# Patient Record
Sex: FEMALE | Race: OTHER | Employment: UNEMPLOYED | URBAN - METROPOLITAN AREA
[De-identification: names, ages, dates, MRNs, and addresses within clinical notes are randomized per-mention and may not be internally consistent; named-entity substitution may affect disease eponyms.]

---

## 2019-01-25 ENCOUNTER — APPOINTMENT (OUTPATIENT)
Dept: GENERAL RADIOLOGY | Age: 34
End: 2019-01-25
Attending: EMERGENCY MEDICINE
Payer: SELF-PAY

## 2019-01-25 ENCOUNTER — HOSPITAL ENCOUNTER (EMERGENCY)
Age: 34
Discharge: HOME | End: 2019-01-25
Attending: EMERGENCY MEDICINE
Payer: SELF-PAY

## 2019-01-25 VITALS
HEIGHT: 63 IN | DIASTOLIC BLOOD PRESSURE: 83 MMHG | OXYGEN SATURATION: 100 % | HEART RATE: 73 BPM | TEMPERATURE: 97.9 F | WEIGHT: 188 LBS | RESPIRATION RATE: 16 BRPM | BODY MASS INDEX: 33.31 KG/M2 | SYSTOLIC BLOOD PRESSURE: 118 MMHG

## 2019-01-25 DIAGNOSIS — K21.9 GASTROESOPHAGEAL REFLUX DISEASE, ESOPHAGITIS PRESENCE NOT SPECIFIED: Primary | ICD-10-CM

## 2019-01-25 PROCEDURE — 71046 X-RAY EXAM CHEST 2 VIEWS: CPT

## 2019-01-25 PROCEDURE — 93005 ELECTROCARDIOGRAM TRACING: CPT

## 2019-01-25 PROCEDURE — 99282 EMERGENCY DEPT VISIT SF MDM: CPT

## 2019-01-25 PROCEDURE — 74011250637 HC RX REV CODE- 250/637: Performed by: EMERGENCY MEDICINE

## 2019-01-25 RX ORDER — FAMOTIDINE 40 MG/1
40 TABLET, FILM COATED ORAL
Qty: 15 TAB | Refills: 0 | Status: SHIPPED | OUTPATIENT
Start: 2019-01-25 | End: 2019-02-09

## 2019-01-25 RX ORDER — FAMOTIDINE 20 MG/1
20 TABLET, FILM COATED ORAL
Status: COMPLETED | OUTPATIENT
Start: 2019-01-25 | End: 2019-01-25

## 2019-01-25 RX ADMIN — FAMOTIDINE 20 MG: 20 TABLET ORAL at 13:02

## 2019-01-25 NOTE — ED PROVIDER NOTES
28 y.o. female with no significant past medical history who presents from home with chief complaint of epigastric pain. Pt has been experiencing worsening epigastric pain over the past 2 months with associated nausea. Her sx are worse at night and exacerbated by eating and deep pleuritic movement. Pt states that she has tried drinking water and milk and milk somewhat alleviates her symptoms. Pt denies fever, vomiting, diarrhea. There are no other acute medical concerns at this time. Social hx: no tobacco use; no EtOH use Note written by Lata Mace, as dictated by oJ Vigil MD 12:51 PM 
 
 
The history is provided by the patient and a relative. No past medical history on file. No past surgical history on file. No family history on file. Social History Socioeconomic History  Marital status: SINGLE Spouse name: Not on file  Number of children: Not on file  Years of education: Not on file  Highest education level: Not on file Social Needs  Financial resource strain: Not on file  Food insecurity - worry: Not on file  Food insecurity - inability: Not on file  Transportation needs - medical: Not on file  Transportation needs - non-medical: Not on file Occupational History  Not on file Tobacco Use  Smoking status: Never Smoker Substance and Sexual Activity  Alcohol use: No  
 Drug use: Not on file  Sexual activity: Not on file Other Topics Concern  Not on file Social History Narrative  Not on file ALLERGIES: Patient has no known allergies. Review of Systems Constitutional: Negative for chills and fever. Respiratory: Negative for shortness of breath. Cardiovascular: Negative for chest pain. Gastrointestinal: Positive for abdominal pain and nausea. Negative for constipation, diarrhea and vomiting. Neurological: Negative for dizziness and light-headedness. All other systems reviewed and are negative. Vitals:  
 01/25/19 1251 BP: 118/83 Pulse: 73 Resp: 16 Temp: 97.9 °F (36.6 °C) SpO2: 100% Weight: 85.3 kg (188 lb) Height: 5' 3\" (1.6 m) Physical Exam  
Constitutional: She is oriented to person, place, and time. She appears well-developed and well-nourished. HENT:  
Head: Normocephalic and atraumatic. Eyes: EOM are normal. Pupils are equal, round, and reactive to light. No scleral icterus. Neck: Normal range of motion. Cardiovascular: Normal rate, regular rhythm, normal heart sounds and intact distal pulses. Pulmonary/Chest: Effort normal and breath sounds normal.  
Abdominal: Soft. Bowel sounds are normal. She exhibits no distension. Musculoskeletal: Normal range of motion. Neurological: She is alert and oriented to person, place, and time. Skin: No rash noted. No erythema. Nursing note and vitals reviewed. Note written by Lata Aguirre, as dictated by Rowan Barrios MD 12:51 PM 
 
 
MDM Number of Diagnoses or Management Options Gastroesophageal reflux disease, esophagitis presence not specified: established and worsening Diagnosis management comments: The patient is resting comfortably and feels better, is alert and in no distress. The repeat examination is unremarkable and benign; in particular, there is no discomfort at McBurney's point. The history, exam, diagnostic testing, and current condition do not suggest acute appendicitis, bowel obstruction, incarcerated hernia, acute cholecystitis, bowel perforation, major gastrointestinal bleeding, severe diverticulitis, sepsis, or other significant pathology to warrant further testing, continued ED treatment, admission, or surgical evaluation at this point. The vital signs have been stable and are within normal limits at this time.  The patient does not have uncontrollable pain, intractable vomiting, or other significant symptoms. The patient's condition is stable and appropriate for discharge. The patient will pursue further outpatient evaluation with the primary care physician or other designated or consulting physician as indicated in the discharge instructions. Procedures ED EKG interpretation: 
Rhythm: normal sinus rhythm; and regular . Rate (approx.): 63; ST/T wave: no changes; no ectopy. Note written by Lata Garibay, as dictated by Zane Reyna MD 1:01 PM 
 
2:08 PM 
Patient's results have been reviewed with them. Patient and/or family have verbally conveyed their understanding and agreement of the patient's signs, symptoms, diagnosis, treatment and prognosis and additionally agree to follow up as recommended or return to the Emergency Room should their condition change prior to follow-up. Discharge instructions have also been provided to the patient with some educational information regarding their diagnosis as well a list of reasons why they would want to return to the ER prior to their follow-up appointment should their condition change.

## 2019-01-25 NOTE — ED NOTES
Pt reports improvement in symptoms post medication. Pt given discharge instructions and all questions answered. Denies questions. Pt leaves in no acute distress.

## 2019-01-25 NOTE — DISCHARGE INSTRUCTIONS
Patient Education        Enfermedad de reflujo gastroesofágico (GERD): Instrucciones de cuidado - [ Gastroesophageal Reflux Disease (GERD): Care Instructions ]  Instrucciones de cuidado    La enfermedad de reflujo gastroesofágico (GERD, por mike siglas en inglés) es cuando el ácido estomacal se devuelve hacia el esófago. El esófago es el conducto que va de la garganta al [facility] [place]. Kita válvula de kita roberta vía elizabeth que el ácido del estómago se devuelva por aurelio conducto. Cuando usted tiene GERD, esta válvula no se pardeep lo suficientemente firme. Si usted tiene síntomas leves de GERD, aleksandar acidez estomacal, es posible que pueda controlar el problema con antiácidos o medicamentos de [employer]. Cambiar la alimentación, bajar de peso y hacer otros cambios en el estilo de edward también pueden ayudar a reducir los síntomas. La atención de seguimiento es kita parte clave de diaz tratamiento y seguridad. Asegúrese de hacer y acudir a todas las citas, y llame a diaz médico si está teniendo problemas. También es kita buena idea saber los resultados de mike exámenes y mantener kita lista de los medicamentos que maria elena. ¿Cómo puede cuidarse en el hogar? · [employer] medicamentos exactamente aleksandar le fueron recetados. Llame a diaz médico si jeferson estar teniendo problemas con diaz medicamento. · Diaz médico le podría recomendar medicamentos de [employer]. Para la indigestión leve u ocasional pueden servirle los antiácidos aleksandar Tums, Gaviscon, Mylanta o Maalox. Diaz médico también podría recomendar reductores de ácido de venta ade, aleksandar Pepcid AC, Tagamet HB, Zantac 75 o Prilosec. Quynh y siga todas las instrucciones de la [place]. Si [place] estos medicamentos con frecuencia, hable con diaz médico.  · Cambie mike hábitos alimenticios. ? Es mejor comer varias comidas pequeñas en lugar de dos o luz comidas abundantes. ? Después de comer, espere de 2 a 3 horas antes de recostarse. ? El chocolate, la menta y el alcohol pueden empeorar la GERD. ?  En algunas personas, los alimentos condimentados, los alimentos que tienen mucho ácido (aleksandar los tomates y las naranjas) y el café pueden empeorar los síntomas de la GERD. Si columba síntomas empeoran después de comer un determinado alimento, se recomienda que deje de comer yanet alimento para neo si columba síntomas mejoran. · No fume ni masque tabaco. Fumar puede agravar la GERD. Si necesita ayuda para dejar de usar tabaco, hable con estevez médico [employer] y medicamentos para dejar de usarlo. Éstos pueden aumentar columba probabilidades de dejar el hábito para siempre. · Si tiene síntomas de GERD robel la noche, eleve la cabecera de estevez cama entre 6 y 6 pulgadas (entre 15 y 20 cm) colocando ladrillos debajo del darwin de la cama o kita cuña de espuma debajo de la cabecera del colchón. (Usar almohadas adicionales no funciona). · No use ropa que le ajuste mucho la parte media del cuerpo. · Baje de peso, si necesita hacerlo. Perder sólo de 5 a 10 libras (2.3 a 4.5 kg) puede ayudarle. ¿Cuándo debe pedir ayuda? Llame a estevez médico ahora mismo o busque atención médica inmediata si:    · Tiene un dolor de estómago nuevo o distinto.     · Columba heces son negruzcas y parecidas al alquitrán o tienen rastros de mata.    Preste especial atención a los cambios en estevez keerthi y asegúrese de comunicarse con estevez médico si:    · Columba síntomas no mendez tomy después de 2 días.     · La comida parece quedarle atorada en la garganta o el pecho. ¿Dónde puede encontrar más información en inglés? [facility] a http://pj-nasima.info/. Elinda Farrar T623 en la búsqueda para aprender más acerca de \"Enfermedad de reflujo gastroesofágico (GERD): Instrucciones de cuidado - [ Gastroesophageal Reflux Disease (GERD): Care Instructions ]. \"  Revisado: Epifanio 67, 2018  Versión del contenido: 11.9  © 7561-1924 [employer], [employer].  Las instrucciones de cuidado fueron adaptadas bajo licencia por [employer] (which disclaims liability or warranty for this information). Si usted tiene [place] afección médica o sobre estas instrucciones, siempre pregunte a estevez profesional de keerthi. [facility] niega toda garantía o responsabilidad por estevez uso de esta información.

## 2019-01-28 LAB
ATRIAL RATE: 63 BPM
CALCULATED P AXIS, ECG09: 18 DEGREES
CALCULATED R AXIS, ECG10: 3 DEGREES
CALCULATED T AXIS, ECG11: 9 DEGREES
DIAGNOSIS, 93000: NORMAL
P-R INTERVAL, ECG05: 102 MS
Q-T INTERVAL, ECG07: 424 MS
QRS DURATION, ECG06: 74 MS
QTC CALCULATION (BEZET), ECG08: 433 MS
VENTRICULAR RATE, ECG03: 63 BPM

## 2019-09-04 ENCOUNTER — LAB ONLY (OUTPATIENT)
Dept: FAMILY MEDICINE CLINIC | Age: 34
End: 2019-09-04

## 2019-09-04 DIAGNOSIS — Z13.9 ENCOUNTER FOR SCREENING: Primary | ICD-10-CM

## 2019-09-04 LAB
HCG URINE, QL. (POC): POSITIVE
VALID INTERNAL CONTROL?: YES

## 2019-09-04 NOTE — PROGRESS NOTES
Completed confirmation of pregnancy form and gave to pt. Call made to [facility] to make appt, not able to get on at this time. They advised that the will call pt to make appt. Provided pt with information for [address], advised that if she does not hear from them in 24hrs she will need to to call them. [facility] info given and explained. Told to make sure she lets them know she does not have insurance when she calls to make her appt at [facility]. Explained that the care card is our financial assistance program.  Also advised pt to start prenatal vitamins as soon as possible. Pt indicated understanding and had no questions.     LMP 05/12/19  EDC 02/17/20

## 2019-09-12 ENCOUNTER — INITIAL PRENATAL (OUTPATIENT)
Dept: FAMILY MEDICINE CLINIC | Age: 34
End: 2019-09-12

## 2019-09-12 ENCOUNTER — HOSPITAL ENCOUNTER (OUTPATIENT)
Dept: LAB | Age: 34
Discharge: HOME | End: 2019-09-12
Payer: SUBSIDIZED

## 2019-09-12 VITALS
TEMPERATURE: 97 F | DIASTOLIC BLOOD PRESSURE: 65 MMHG | HEIGHT: 61 IN | RESPIRATION RATE: 14 BRPM | OXYGEN SATURATION: 97 % | BODY MASS INDEX: 39.88 KG/M2 | WEIGHT: 211.2 LBS | HEART RATE: 82 BPM | SYSTOLIC BLOOD PRESSURE: 102 MMHG

## 2019-09-12 DIAGNOSIS — O09.32 INITIAL OBSTETRIC VISIT IN SECOND TRIMESTER: Primary | ICD-10-CM

## 2019-09-12 DIAGNOSIS — O24.419 GESTATIONAL DIABETES MELLITUS (GDM) AFFECTING FOURTH PREGNANCY: ICD-10-CM

## 2019-09-12 LAB
BILIRUB UR QL STRIP: NEGATIVE
GLUCOSE UR-MCNC: NEGATIVE MG/DL
HBSAG, EXTERNAL: NEGATIVE
HIV, EXTERNAL: NORMAL
KETONES P FAST UR STRIP-MCNC: NORMAL MG/DL
PH UR STRIP: 7 [PH] (ref 4.6–8)
PROT UR QL STRIP: NEGATIVE
RUBELLA, EXTERNAL: NORMAL
SP GR UR STRIP: 1.01 (ref 1–1.03)
T. PALLIDUM, EXTERNAL: NEGATIVE
TYPE, ABO & RH, EXTERNAL: NORMAL
UA UROBILINOGEN AMB POC: NORMAL (ref 0.2–1)
URINALYSIS CLARITY POC: CLEAR
URINALYSIS COLOR POC: YELLOW
URINE BLOOD POC: NEGATIVE
URINE LEUKOCYTES POC: NORMAL
URINE NITRITES POC: NEGATIVE

## 2019-09-12 PROCEDURE — 87624 HPV HI-RISK TYP POOLED RSLT: CPT

## 2019-09-12 PROCEDURE — 88175 CYTOPATH C/V AUTO FLUID REDO: CPT

## 2019-09-12 RX ORDER — GUAIFENESIN 100 MG/5ML
81 LIQUID (ML) ORAL DAILY
Qty: 30 TAB | Refills: 3 | Status: SHIPPED | OUTPATIENT
Start: 2019-09-12 | End: 2020-01-23

## 2019-09-12 NOTE — PATIENT INSTRUCTIONS
Semanas 18 a 22 de diaz embarazo: Instrucciones de cuidado - [ Archana Eddy 18 to 22 of Your Pregnancy: Care Instructions ]  Instrucciones de cuidado    Diaz bebé continúa desarrollándose rápidamente. En esta etapa, los bebés ya pueden chuparse el pulgar, agarrar firmemente con las [place], y abrir y cerrar los párpados. En algún Fan's 18 y 25, comenzará a sentir que el bebé se [place]. Al principio, estos pequeños movimientos se sentirán aleksandar un aleteo o un vuelo de mariposas. Algunas mujeres dicen que sienten aleksandar burbujas de [place]. A medida que el bebé crece, estos movimientos serán más craolina. También podría observar que diaz bebé patea y tiene hipo. Robel aurelio María, podría descubrir que las náuseas y la fatiga desaparecieron. En general, es posible que se sienta mejor y tenga más energía que la que tenía robel el primer trimestre. Sin embargo, también podría tener nuevas [place], aleksandar problemas para dormir o calambres en las piernas. Esta hoja de cuidados la ayudará a aliviar esas molestias. La atención de seguimiento es kita parte clave de diaz tratamiento y seguridad. Asegúrese de hacer y acudir a todas las citas, y llame a diaz médico si está teniendo problemas. También es kita buena idea saber los resultados de mike exámenes y mantener kita lista de los medicamentos que maria elena. ¿Cómo puede cuidarse en el hogar? Alivie los problemas para dormir  · Evite la cafeína en las bebidas o los chocolates a última hora del día. · Moisés algo de ejercicio todos los días. · Dúchese o báñese en agua tibia antes de irse a la cama. · Coma un refrigerio liviano o camille un vaso de leche a la hora de dormir. · Moisés ejercicios de relajación en la cama para tranquilizar diaz mente y diaz cuerpo. · Apoye mike piernas y diaz espalda con almohadas adicionales. Si duerme de costado, pruebe a ponerse kita [employer] mike piernas. · No use píldoras para dormir ni consuma alcohol. Podrían hacerle daño a diaz bebé.   Sukhi & Paula calambres en las piernas  · No masajee estevez pantorrilla mientras tiene un calambre. · Siéntese en kita cama o silla firme. Estire la wanger y [facility] [employer] [facility] ([facility] el [facility]) despacio, [place] patty, en dirección a la rodilla. Doble los dedos de los pies hacia arriba y [place]. · Póngase de pie sobre kita superficie plana y fresca. Estire los dedos de los pies hacia arriba y dé pequeños pasos con el talón. · Use kita almohadilla térmica o kita bolsa de [place] para aliviar palma musculares. Prevenga los calambres en las piernas  · Asegúrese de consumir suficiente calcio. Si está preocupada porque no está obteniendo lo suficiente, hable con estevez médico.  · Moisés ejercicio todos los nabila y estire las piernas antes de irse a dormir. · Dese un baño tibio antes de irse a dormir y pruebe a usar calentadores de piernas. ¿Dónde puede encontrar más información en inglés? [place] a http://pj-nasima.info/. Santana Alarcon A073 en la búsqueda para aprender más acerca de \"Semanas 18 a 22 de estevez embarazo: Instrucciones de cuidado - [ Rhunedane Simmonsick 18 to 22 of Your Pregnancy: Care Instructions ]. \"  Revisado: 5 septiembre, 2018  Versión del contenido: 12.1  © 5599-7614 [employer]wise, Incorporated. Las instrucciones de cuidado fueron adaptadas bajo licencia por [employer] (which disclaims liability or warranty for this information). Si usted tiene [place] afección médica o sobre estas instrucciones, siempre pregunte a estevez profesional de keerthi. [employer]wise, Incorporated niega toda garantía o responsabilidad por estevez uso de esta información.

## 2019-09-12 NOTE — PROGRESS NOTES
Subjective:   William Wheat is a 27 y.o.  at 18w3d who is being seen today for her first obstetrical visit. This is a not planned pregnancy but it is welcomed. Patient was not using any birth control, only condoms with her . She is at 18w3d gestation by LMP approximately  2019     [id number]  · 5th pregnancy (current)   · 4th pregnancy ()- Male. Delivery method: Vaginal. Fetal weight: 8lbs. Complications: Gestational diabetes, resolved. In [address]   · 3rd pregnancy (): miscarriage at 3 months  · 2nd pregnancy ()- Male. Delivery method: Vaginal. Fetal weight: 7lbs . Complications: None. In [address]   · 1st pregnancy ()- Female. Delivery method: Home delivery, . Fetal weight: unsure. Complications: None. In [place]. History of GDM or DM? Yes in 4th pregnancy  History of GHTN or HTN? No  History of pre-eclampsia? No  Taking prenatal vitamins? Not at this time. Desires genetic testing? Not at this time    Allergies  No Known Allergies    Medications  Current Outpatient Medications   Medication Sig    prenatal vit-calcium-iron-fa (PRENATAL PLUS WITH CALCIUM) 27 mg iron- 1 mg tab Take 1 Tab by mouth daily. No current facility-administered medications for this visit. Past Medical History  History reviewed. No pertinent past medical history. STDs? None    Past Surgical History  History reviewed. No pertinent surgical history. CSections? None    Family History  Genetic abnormalities?  None    Social History  Social History     Tobacco Use    Smoking status: Never Smoker    Smokeless tobacco: Never Used   Substance Use Topics    Alcohol use: Not Currently    Drug use: Never     Objective:     Visit Vitals  /65 (BP 1 Location: Left arm, BP Patient Position: Sitting)   Pulse 82   Temp 97 °F (36.1 °C) (Oral)   Resp 14   Ht 5' 1\" (1.549 m)   Wt 211 lb 3.2 oz (95.8 kg)   LMP 2019   SpO2 97%   BMI 39.91 kg/m²     See physical exam on flowsheet  [employer] exam chaperoned by Flo Chamberlain LPN    Labs  Initial OB labs collected at today's visit   Recent Results (from the past 12 hour(s))   AMB POC URINALYSIS DIP STICK AUTO W/O MICRO    Collection Time: 19 11:53 AM   Result Value Ref Range    Color (UA POC) Yellow     Clarity (UA POC) Clear     Glucose (UA POC) Negative Negative    Bilirubin (UA POC) Negative Negative    Ketones (UA POC) Trace Negative    Specific gravity (UA POC) 1.015 1.001 - 1.035    Blood (UA POC) Negative Negative    pH (UA POC) 7.0 4.6 - 8.0    Protein (UA POC) Negative Negative    Urobilinogen (UA POC) 0.2 mg/dL 0.2 - 1    Nitrites (UA POC) Negative Negative    Leukocyte esterase (UA POC) 3+ Negative     Assessment and Plan:   Armin Schulz is a 27 y.o.  at 18w3d by LMP here for initial OB visit. Estimated Date of Delivery: 2/10/20     · IOB labs collected (CBC without diff, blood type & screen, Rh antibody screen, rubella titer, HBsAg titer, RPR, HIV, UA w/ cx, pap smear, gonorrhea/chlamydia)  · Discussed recommended weight gain (current BMI  >29) Plan for a 11-20lbs weight gain    · Recommended prenatal vitamins, script sent to pharmacy at patient request  · Patient with dysuria and +3 LE  - will follow up for urine culture  · AMA: start Aspirin  · Hx of GDM with obesity: will obtain early 1hr OGTT   · Discussed optional genetic screening: Patient will discuss with partner   · Request for M anatomy scan faxed: Yes  · Dating ultrasound scheduled: Deferred for anatomy ultrasound   · Follow up scheduled with myself in 4 weeks     I have discussed the diagnosis with the patient and the intended plan as seen in the above orders. The patient has received an after-visit summary and questions were answered concerning future plans. I have discussed medication side effects and warnings with the patient as well.  Informed pt to return to the office or go to the ER if she experiences vaginal bleeding, vaginal discharge, leaking of fluid, pelvic cramping.       Patient discussed with Dr. Renée Melgar (Attending Physician)    Amilcar Mi MD  Family Medicine Resident

## 2019-09-12 NOTE — PROGRESS NOTES
28 yo  here at 25 w 3d    G1 -- home delivery  G2 --    G3 -- SAb  G4 -- GDM A1     Offered genetic testing    AMA  Maternal obesity  Hx GDM A1    U/A:  3+    Plan:  Fetal anatomy scan  Start baby ASA   Urine culture     I reviewed with the resident the medical history and the resident's findings on the physical examination. I discussed with the resident the patient's diagnosis and concur with the plan.

## 2019-09-14 LAB — BACTERIA UR CULT: ABNORMAL

## 2019-09-16 ENCOUNTER — TELEPHONE (OUTPATIENT)
Dept: FAMILY MEDICINE CLINIC | Age: 34
End: 2019-09-16

## 2019-09-16 RX ORDER — PYRIDOXINE HCL (VITAMIN B6) 25 MG
25 TABLET ORAL DAILY
Qty: 30 TAB | Refills: 3 | Status: ON HOLD | OUTPATIENT
Start: 2019-09-16 | End: 2020-01-20

## 2019-09-16 RX ORDER — NITROFURANTOIN 25; 75 MG/1; MG/1
100 CAPSULE ORAL 2 TIMES DAILY
Qty: 10 CAP | Refills: 0 | Status: SHIPPED | OUTPATIENT
Start: 2019-09-16 | End: 2019-10-11

## 2019-09-16 NOTE — TELEPHONE ENCOUNTER
----- Message from Ivania Munroe sent at 9/14/2019  9:30 AM EDT -----  Can you assist with scheduling an appt for patient. She needs a lab only appt. for 1 hour glucose. Bhanu Gerber    ----- Message -----  From: Cheryl Raines MD  Sent: 9/12/2019   4:10 PM EDT  To: Jade Andrew,   Can we please schedule this patient to come in within a week or so to get an early 1hr glucose tolerance test done? Thank you!

## 2019-09-16 NOTE — TELEPHONE ENCOUNTER
Called with [facility] and spoke with the patient and scheduled for 9/18 lab appointment. She is now calling to say she has an infection and the doctor did not order the medication for her. Also she said she asked the doctor to measure her at the visit and she did not do it.

## 2019-09-16 NOTE — PROGRESS NOTES
Patient was symptomatic at visit and continues to be symptomatic. Will send in [facility] prescription.  I will call the patient

## 2019-09-16 NOTE — TELEPHONE ENCOUNTER
11:43 AM   Interpretor #[id number]    I returned Nunu's call. I sent in Macrobid 100mg BID for 5 days to the pharmacy. I also asked the patient to  her daily Aspirin. She is also to continue to take her prenatal vitamins and Unisom/B6 for sleep and nausea. I explained that we do not normally measure her abdomen until 20 weeks and we will be in touch soon when her MFM appointment is scheduled. She was in agreement of this plan.

## 2019-09-17 LAB
ABO GROUP BLD: NORMAL
BASOPHILS # BLD AUTO: 0.1 X10E3/UL (ref 0–0.2)
BASOPHILS NFR BLD AUTO: 0 %
EOSINOPHIL # BLD AUTO: 0.8 X10E3/UL (ref 0–0.4)
EOSINOPHIL NFR BLD AUTO: 6 %
ERYTHROCYTE [DISTWIDTH] IN BLOOD BY AUTOMATED COUNT: 20 % (ref 12.3–15.4)
HBV SURFACE AG SERPL QL IA: NEGATIVE
HCT VFR BLD AUTO: 27.3 % (ref 34–46.6)
HGB A MFR BLD: 98.2 % (ref 96.4–98.8)
HGB A2 MFR BLD COLUMN CHROM: 1.8 % (ref 1.8–3.2)
HGB BLD-MCNC: 7.8 G/DL (ref 11.1–15.9)
HGB C MFR BLD: 0 %
HGB F MFR BLD: 0 % (ref 0–2)
HGB FRACT BLD-IMP: NORMAL
HGB OTHER MFR BLD HPLC: 0 %
HGB S BLD QL SOLY: NEGATIVE
HGB S MFR BLD: 0 %
HIV 1+2 AB+HIV1 P24 AG SERPL QL IA: NON REACTIVE
IMM GRANULOCYTES # BLD AUTO: 0.1 X10E3/UL (ref 0–0.1)
IMM GRANULOCYTES NFR BLD AUTO: 1 %
LYMPHOCYTES # BLD AUTO: 2.1 X10E3/UL (ref 0.7–3.1)
LYMPHOCYTES NFR BLD AUTO: 15 %
MCH RBC QN AUTO: 17.2 PG (ref 26.6–33)
MCHC RBC AUTO-ENTMCNC: 28.6 G/DL (ref 31.5–35.7)
MCV RBC AUTO: 60 FL (ref 79–97)
MONOCYTES # BLD AUTO: 0.7 X10E3/UL (ref 0.1–0.9)
MONOCYTES NFR BLD AUTO: 5 %
NEUTROPHILS # BLD AUTO: 10 X10E3/UL (ref 1.4–7)
NEUTROPHILS NFR BLD AUTO: 73 %
PLATELET # BLD AUTO: 203 X10E3/UL (ref 150–450)
RBC # BLD AUTO: 4.54 X10E6/UL (ref 3.77–5.28)
RH BLD: POSITIVE
RUBV IGG SERPL IA-ACNC: 8.03 INDEX
T PALLIDUM AB SER QL IA: NEGATIVE
VZV IGG SER IA-ACNC: 871 INDEX
WBC # BLD AUTO: 13.8 X10E3/UL (ref 3.4–10.8)

## 2019-09-18 ENCOUNTER — LAB ONLY (OUTPATIENT)
Dept: FAMILY MEDICINE CLINIC | Age: 34
End: 2019-09-18

## 2019-09-18 DIAGNOSIS — O24.419 GESTATIONAL DIABETES MELLITUS (GDM) AFFECTING FOURTH PREGNANCY: ICD-10-CM

## 2019-09-19 ENCOUNTER — TELEPHONE (OUTPATIENT)
Dept: FAMILY MEDICINE CLINIC | Age: 34
End: 2019-09-19

## 2019-09-19 DIAGNOSIS — R73.09 GLUCOSE TOLERANCE TEST ABNORMAL: ICD-10-CM

## 2019-09-19 DIAGNOSIS — D50.9 IRON DEFICIENCY ANEMIA DURING PREGNANCY: Primary | ICD-10-CM

## 2019-09-19 DIAGNOSIS — O99.019 IRON DEFICIENCY ANEMIA DURING PREGNANCY: Primary | ICD-10-CM

## 2019-09-19 LAB — GLUCOSE 1H P 50 G GLC PO SERPL-MCNC: 194 MG/DL (ref 65–139)

## 2019-09-20 NOTE — TELEPHONE ENCOUNTER
Interpetor # [id number]    I called and spoke to [facility] regarding her lab results and the plan going forward. 1. Abnormal 1hr GTT: She will return for a 3hr GTT pending a call from [facility] with a lab appointment     2. Anemia: I have ordered more blood tests to work up her anemia: to be drawn when she comes in for her lab visit. She is aware to  her iron supplements from the pharmacy. She will likely need IV iron infusions through Heme/Onc, she is aware of this. ->I called Heme/Onc and made her an appointment with Dr. Juan R Calvillo on Friday Oct 4th at 8.30am (please arrive at [address] [place] [address], [address] [place] [address].)   [facility] to let her know about this appointment when calling to schedule her lab visit. 3. Yeast Vaginits: I sent in a vaginal Clotrimazole prescription for 3 days to treat the yeast.      4. MFM: appointment scheduled on 09/26 at 8am for anatomy ultrasound. Patient made aware of this appointment during our phone call.

## 2019-09-20 NOTE — PROGRESS NOTES
PNL: O positive. CBC with Hb 7.8. Nml adult Hb present. HepB neg. VZV/Rubella immune. Tpall negative. HIV NR. G/C pending. Pap NILM, negative HPV. Yeast present. Failed early 1 hr GTT - will order 3hr GTT    CBC: severely anemic, Hb 7.8. Will order ferrous sulfate TID. Further anemia labs ordered. Will refer patient to Heme/Onc for infusions and evaluation. Will also speak to MFM. I will call the patient to let her know.

## 2019-09-23 ENCOUNTER — TELEPHONE (OUTPATIENT)
Dept: FAMILY MEDICINE CLINIC | Age: 34
End: 2019-09-23

## 2019-09-23 RX ORDER — LANOLIN ALCOHOL/MO/W.PET/CERES
325 CREAM (GRAM) TOPICAL
Qty: 90 TAB | Refills: 2 | Status: SHIPPED | OUTPATIENT
Start: 2019-09-23 | End: 2019-10-11

## 2019-09-23 NOTE — TELEPHONE ENCOUNTER
Called pt using [employer] phone. 3hr GTT is Wed. 9/25/19 at 8am. She knows to be fasting. Reviewed Heme/Onc appt with pt. Also reviewed MFM appt. Pt voiced understanding of all 3 appts.

## 2019-09-23 NOTE — TELEPHONE ENCOUNTER
The patient will need to come in for a 3hr GTT and the bloodwork I've ordered. She is aware that [facility] will call her to set this appt up. I also made her an appointment with Heme/Onc's Dr. Carley Ryder on Friday Oct 4th at 8.30am (please arrive at [address] [place] [address], [address] [place] [address]). ->I told her we would let her know about this appointment when we called back to schedule her lab visit. She is Thai speaking. I answered all her questions in our last phone call but please let me know if anything further comes up and I will take care of it.      Thank you

## 2019-09-25 ENCOUNTER — LAB ONLY (OUTPATIENT)
Dept: FAMILY MEDICINE CLINIC | Age: 34
End: 2019-09-25

## 2019-09-25 DIAGNOSIS — D50.9 IRON DEFICIENCY ANEMIA DURING PREGNANCY: ICD-10-CM

## 2019-09-25 DIAGNOSIS — R73.09 GLUCOSE TOLERANCE TEST ABNORMAL: ICD-10-CM

## 2019-09-25 DIAGNOSIS — O99.019 IRON DEFICIENCY ANEMIA DURING PREGNANCY: ICD-10-CM

## 2019-09-26 ENCOUNTER — HOSPITAL ENCOUNTER (OUTPATIENT)
Dept: PERINATAL CARE | Age: 34
Discharge: HOME | End: 2019-09-26
Attending: OBSTETRICS & GYNECOLOGY
Payer: SELF-PAY

## 2019-09-26 PROCEDURE — 76811 OB US DETAILED SNGL FETUS: CPT | Performed by: OBSTETRICS & GYNECOLOGY

## 2019-09-27 LAB
FERRITIN SERPL-MCNC: 9 NG/ML (ref 15–150)
GLUCOSE 1H P 100 G GLC PO SERPL-MCNC: 185 MG/DL (ref 65–179)
GLUCOSE 2H P 100 G GLC PO SERPL-MCNC: 153 MG/DL (ref 65–154)
GLUCOSE 3H P 100 G GLC PO SERPL-MCNC: 107 MG/DL (ref 65–139)
GLUCOSE P FAST SERPL-MCNC: 80 MG/DL (ref 65–94)
HAPTOGLOB SERPL-MCNC: 137 MG/DL (ref 34–200)
IRON SATN MFR SERPL: 9 % (ref 15–55)
IRON SERPL-MCNC: 46 UG/DL (ref 27–159)
LDH SERPL-CCNC: 154 IU/L (ref 119–226)
NOTE:, 102047: ABNORMAL
RETICS/RBC NFR AUTO: 2 % (ref 0.6–2.6)
TIBC SERPL-MCNC: 497 UG/DL (ref 250–450)
UIBC SERPL-MCNC: 451 UG/DL (ref 131–425)

## 2019-09-30 NOTE — TELEPHONE ENCOUNTER
Severe iron deficiency anemia.    Patient started on oral ferrous sulfate TID and has appointment to see Heme/onc this week for eval and likely iron infusions

## 2019-10-02 NOTE — PROGRESS NOTES
[id number] [facility] Oncology at [facility]  [phone]    Hematology / Oncology Consult    Reason for Visit:   Sherwin Moe is a 27 y.o. female who is seen in consultation at the request of Dr. Radha Cardona for evaluation of anemia. History of Present Illness:   Sherwin Moe is a 27 y.o. female who comes in for evaluation of anemia. She is Lithuanian speaking and was interviewed with assistance of a video . Patient is pregnant. She denies melena/hematochezia/hematuria. She denies heavy periods prior to pregnancy. She has had 3 prior pregnancies and did not have severe anemia at that time. No CP, but does endorse SOB with exertion. She endorses ice cravings, which started approx earlier this year. She states she is taking prenatal vitamins, but not taking iron supplements. She is also taking ASA and Vit C. No past medical history on file. No past surgical history on file. Social History     Tobacco Use    Smoking status: Never Smoker    Smokeless tobacco: Never Used   Substance Use Topics    Alcohol use: Not Currently      No family history on file. Current Outpatient Medications   Medication Sig    ferrous sulfate 325 mg (65 mg iron) tablet Take 1 Tab by mouth three (3) times daily (with meals).  nitrofurantoin, macrocrystal-monohydrate, (MACROBID) 100 mg capsule Take 1 Cap by mouth two (2) times a day. Indications: urinary tract infection due to E. coli bacteria    doxylamine succinate (UNISOM) 25 mg tablet Take 1 Tab by mouth nightly as needed for Sleep.  pyridoxine, vitamin B6, (VITAMIN B-6) 25 mg tablet Take 1 Tab by mouth daily.  prenatal vit-calcium-iron-fa (PRENATAL PLUS WITH CALCIUM) 27 mg iron- 1 mg tab Take 1 Tab by mouth daily.  aspirin 81 mg chewable tablet Take 1 Tab by mouth daily.  chlorhexidine (HIBICLENS) 4 % liquid [place] en todo el cuerpo y [place] para david minutos y enjuague.  No poner en mike ojos or boca     No current facility-administered medications for this visit. No Known Allergies     Review of Systems: A complete review of systems was obtained, negative except as described above. Physical Exam:     Visit Vitals  /69   Pulse 78   Temp 98 °F (36.7 °C) (Oral)   Resp 16   Ht 5' 1\" (1.549 m)   Wt 212 lb (96.2 kg)   LMP 05/06/2019   SpO2 98%   BMI 40.06 kg/m²     ECOG PS: 0  General: Well developed, no acute distress  Eyes: PERRLA, EOMI, anicteric sclerae  HENT: Atraumatic, OP clear, TMs intact without erythema  Neck: Supple  Lymphatic: No cervical, supraclavicular, axillary or inguinal adenopathy  Respiratory: CTAB, normal respiratory effort  CV: Normal rate, regular rhythm, no murmurs, no peripheral edema  GI: Soft, nontender, nondistended, no masses, no hepatomegaly, no splenomegaly  MS: Normal gait and station. Digits without clubbing or cyanosis. Skin: No rashes, ecchymoses, or petechiae. Normal temperature, turgor, and texture. Neuro/Psych: Alert, oriented. 5/5 strength in all 4 extremities. Appropriate affect, normal judgment/insight. Results:     Lab Results   Component Value Date/Time    WBC 13.8 (H) 09/12/2019 11:22 AM    HGB 7.8 (L) 09/12/2019 11:22 AM    HCT 27.3 (L) 09/12/2019 11:22 AM    PLATELET 012 39/83/0810 11:22 AM    MCV 60 (L) 09/12/2019 11:22 AM    ABS.  NEUTROPHILS 10.0 (H) 09/12/2019 11:22 AM     Lab Results   Component Value Date/Time    Sodium 138 07/14/2015 10:19 PM    Potassium 4.4 07/14/2015 10:19 PM    Chloride 102 07/14/2015 10:19 PM    CO2 26 07/14/2015 10:19 PM    Glucose 123 (H) 07/14/2015 10:19 PM    Glucose - Fasting 80 09/25/2019 10:37 AM    BUN 7 07/14/2015 10:19 PM    Creatinine 0.62 07/14/2015 10:19 PM    GFR est AA >60 07/14/2015 10:19 PM    GFR est non-AA >60 07/14/2015 10:19 PM    Calcium 8.6 07/14/2015 10:19 PM     No results found for: TBILI, ALT, SGOT, AP, TP, ALB, GLOB  Lab Results   Component Value Date/Time    Iron 46 09/25/2019 10:37 AM    TIBC 497 (H) 09/25/2019 10:37 AM    Iron % saturation 9 (LL) 09/25/2019 10:37 AM    Ferritin 9 (L) 09/25/2019 10:37 AM       No results found for: B12LT, FOL, RBCF  No results found for: TSH, TSH2, TSH3, TSHP, TSHEXT  Lab Results   Component Value Date/Time    Hep B surface Ag screen Negative 09/12/2019 11:22 AM         Imaging:     Radiology report(s) reviewed. Assessment & Plan:   Mayelin Schmid is a 27 y.o. female who is pregnancy comes in for management of iron deficiency anemia. 1. Iron deficiency anemia: Severe, related to increased demand from pregnancy as well as likely prior menstrual loss. She would benefit from parenteral iron, which I have discussed with patient. She needs to also start on oral iron supplements along with stool softeners to avoid constipation. -- Start oral iron supplements if not already started. -- Injectafer x 2  -- Return in 8 weeks for repeat labs    2. Pregnancy: Approx 20 weeks gestation. Followed by Shukridominique Nowak. I appreciate the opportunity to participate in Ms. Kenna Raymond's care.     Signed By: Thao Moore MD     October 2, 2019

## 2019-10-04 ENCOUNTER — OFFICE VISIT (OUTPATIENT)
Dept: ONCOLOGY | Age: 34
End: 2019-10-04

## 2019-10-04 VITALS
TEMPERATURE: 98 F | OXYGEN SATURATION: 98 % | BODY MASS INDEX: 40.02 KG/M2 | WEIGHT: 212 LBS | HEART RATE: 78 BPM | RESPIRATION RATE: 16 BRPM | DIASTOLIC BLOOD PRESSURE: 69 MMHG | HEIGHT: 61 IN | SYSTOLIC BLOOD PRESSURE: 108 MMHG

## 2019-10-04 DIAGNOSIS — Z3A.20 20 WEEKS GESTATION OF PREGNANCY: ICD-10-CM

## 2019-10-04 DIAGNOSIS — D50.8 IRON DEFICIENCY ANEMIA DUE TO DIETARY CAUSES: Primary | ICD-10-CM

## 2019-10-04 PROBLEM — D50.0 IRON DEFICIENCY ANEMIA DUE TO CHRONIC BLOOD LOSS: Status: ACTIVE | Noted: 2019-10-04

## 2019-10-04 RX ORDER — HYDROCORTISONE SODIUM SUCCINATE 100 MG/2ML
100 INJECTION, POWDER, FOR SOLUTION INTRAMUSCULAR; INTRAVENOUS AS NEEDED
Status: CANCELLED | OUTPATIENT
Start: 2019-10-18

## 2019-10-04 RX ORDER — ONDANSETRON 2 MG/ML
8 INJECTION INTRAMUSCULAR; INTRAVENOUS AS NEEDED
Status: CANCELLED | OUTPATIENT
Start: 2019-10-18

## 2019-10-04 RX ORDER — ACETAMINOPHEN 325 MG/1
650 TABLET ORAL AS NEEDED
Status: CANCELLED
Start: 2019-10-18

## 2019-10-04 RX ORDER — HYDROCORTISONE SODIUM SUCCINATE 100 MG/2ML
100 INJECTION, POWDER, FOR SOLUTION INTRAMUSCULAR; INTRAVENOUS AS NEEDED
Status: CANCELLED | OUTPATIENT
Start: 2019-10-25

## 2019-10-04 RX ORDER — HEPARIN 100 UNIT/ML
300-500 SYRINGE INTRAVENOUS AS NEEDED
Status: CANCELLED
Start: 2019-10-18

## 2019-10-04 RX ORDER — DIPHENHYDRAMINE HYDROCHLORIDE 50 MG/ML
50 INJECTION, SOLUTION INTRAMUSCULAR; INTRAVENOUS AS NEEDED
Status: CANCELLED
Start: 2019-10-25

## 2019-10-04 RX ORDER — SODIUM CHLORIDE 0.9 % (FLUSH) 0.9 %
10 SYRINGE (ML) INJECTION AS NEEDED
Status: CANCELLED
Start: 2019-10-25

## 2019-10-04 RX ORDER — ASCORBIC ACID 500 MG
TABLET ORAL DAILY
Status: ON HOLD | COMMUNITY
End: 2020-01-20

## 2019-10-04 RX ORDER — LANOLIN ALCOHOL/MO/W.PET/CERES
325 CREAM (GRAM) TOPICAL
Qty: 60 TAB | Refills: 3 | Status: SHIPPED | OUTPATIENT
Start: 2019-10-04 | End: 2020-01-23

## 2019-10-04 RX ORDER — DIPHENHYDRAMINE HYDROCHLORIDE 50 MG/ML
50 INJECTION, SOLUTION INTRAMUSCULAR; INTRAVENOUS AS NEEDED
Status: CANCELLED
Start: 2019-10-18

## 2019-10-04 RX ORDER — HEPARIN 100 UNIT/ML
300-500 SYRINGE INTRAVENOUS AS NEEDED
Status: CANCELLED
Start: 2019-10-25

## 2019-10-04 RX ORDER — ONDANSETRON 2 MG/ML
8 INJECTION INTRAMUSCULAR; INTRAVENOUS AS NEEDED
Status: CANCELLED | OUTPATIENT
Start: 2019-10-25

## 2019-10-04 RX ORDER — AMOXICILLIN 250 MG
1 CAPSULE ORAL DAILY
Qty: 60 TAB | Refills: 3 | Status: ON HOLD | OUTPATIENT
Start: 2019-10-04 | End: 2020-01-20

## 2019-10-04 RX ORDER — ALBUTEROL SULFATE 0.83 MG/ML
2.5 SOLUTION RESPIRATORY (INHALATION) AS NEEDED
Status: CANCELLED
Start: 2019-10-18

## 2019-10-04 RX ORDER — SODIUM CHLORIDE 9 MG/ML
25 INJECTION, SOLUTION INTRAVENOUS CONTINUOUS
Status: CANCELLED
Start: 2019-10-18

## 2019-10-04 RX ORDER — ACETAMINOPHEN 325 MG/1
650 TABLET ORAL AS NEEDED
Status: CANCELLED
Start: 2019-10-25

## 2019-10-04 RX ORDER — ALBUTEROL SULFATE 0.83 MG/ML
2.5 SOLUTION RESPIRATORY (INHALATION) AS NEEDED
Status: CANCELLED
Start: 2019-10-25

## 2019-10-04 RX ORDER — SODIUM CHLORIDE 9 MG/ML
10 INJECTION INTRAMUSCULAR; INTRAVENOUS; SUBCUTANEOUS AS NEEDED
Status: CANCELLED | OUTPATIENT
Start: 2019-10-18

## 2019-10-04 RX ORDER — SODIUM CHLORIDE 0.9 % (FLUSH) 0.9 %
10 SYRINGE (ML) INJECTION AS NEEDED
Status: CANCELLED
Start: 2019-10-18

## 2019-10-04 RX ORDER — SODIUM CHLORIDE 9 MG/ML
25 INJECTION, SOLUTION INTRAVENOUS CONTINUOUS
Status: CANCELLED
Start: 2019-10-25

## 2019-10-04 RX ORDER — EPINEPHRINE 1 MG/ML
0.3 INJECTION, SOLUTION, CONCENTRATE INTRAVENOUS AS NEEDED
Status: CANCELLED | OUTPATIENT
Start: 2019-10-18

## 2019-10-04 RX ORDER — EPINEPHRINE 1 MG/ML
0.3 INJECTION, SOLUTION, CONCENTRATE INTRAVENOUS AS NEEDED
Status: CANCELLED | OUTPATIENT
Start: 2019-10-25

## 2019-10-04 RX ORDER — SODIUM CHLORIDE 9 MG/ML
10 INJECTION INTRAMUSCULAR; INTRAVENOUS; SUBCUTANEOUS AS NEEDED
Status: CANCELLED | OUTPATIENT
Start: 2019-10-25

## 2019-10-04 NOTE — PROGRESS NOTES
Darline Morel is a 27 y.o. female here today for evaluation of anemia. States she has not picked up ferrous sulfate from pharmacy yet.

## 2019-10-11 ENCOUNTER — ROUTINE PRENATAL (OUTPATIENT)
Dept: FAMILY MEDICINE CLINIC | Age: 34
End: 2019-10-11

## 2019-10-11 VITALS
HEART RATE: 97 BPM | SYSTOLIC BLOOD PRESSURE: 97 MMHG | DIASTOLIC BLOOD PRESSURE: 61 MMHG | WEIGHT: 215 LBS | OXYGEN SATURATION: 98 % | RESPIRATION RATE: 16 BRPM | TEMPERATURE: 98.2 F | HEIGHT: 61 IN | BODY MASS INDEX: 40.59 KG/M2

## 2019-10-11 DIAGNOSIS — O09.32 INITIAL OBSTETRIC VISIT IN SECOND TRIMESTER: ICD-10-CM

## 2019-10-11 DIAGNOSIS — R30.0 DYSURIA DURING PREGNANCY IN SECOND TRIMESTER: ICD-10-CM

## 2019-10-11 DIAGNOSIS — O26.892 DYSURIA DURING PREGNANCY IN SECOND TRIMESTER: ICD-10-CM

## 2019-10-11 DIAGNOSIS — Z3A.22 22 WEEKS GESTATION OF PREGNANCY: Primary | ICD-10-CM

## 2019-10-11 LAB
BILIRUB UR QL STRIP: NEGATIVE
CHLAMYDIA, EXTERNAL: NEGATIVE
GLUCOSE UR-MCNC: NORMAL MG/DL
KETONES P FAST UR STRIP-MCNC: NORMAL MG/DL
N. GONORRHEA, EXTERNAL: NEGATIVE
PH UR STRIP: 6 [PH] (ref 4.6–8)
PROT UR QL STRIP: NEGATIVE
SP GR UR STRIP: 1.02 (ref 1–1.03)
UA UROBILINOGEN AMB POC: NORMAL (ref 0.2–1)
URINALYSIS CLARITY POC: NORMAL
URINALYSIS COLOR POC: YELLOW
URINE BLOOD POC: NEGATIVE
URINE LEUKOCYTES POC: NEGATIVE
URINE NITRITES POC: POSITIVE

## 2019-10-11 RX ORDER — CEPHALEXIN 500 MG/1
500 CAPSULE ORAL 2 TIMES DAILY
Qty: 10 CAP | Refills: 0 | Status: SHIPPED | OUTPATIENT
Start: 2019-10-11 | End: 2019-10-16

## 2019-10-11 RX ORDER — BETA-CAROTENE 10000 UNIT
CAPSULE ORAL
Refills: 0 | COMMUNITY
Start: 2019-10-07 | End: 2019-10-11

## 2019-10-11 NOTE — PROGRESS NOTES
Identified pt with two pt identifiers(name and ). Reviewed record in preparation for visit and have obtained necessary documentation. Chief Complaint   Patient presents with    Routine Prenatal Visit      22w4d Little LOF or Bleeding +FM      Patient states she have thick discharge white in color with odor. Patient states the vaginal cream that was given to her caused her to have a rash in her vagina area. Rash still there a little. Health Maintenance Due   Topic    DTaP/Tdap/Td series (1 - Tdap)    Influenza Age 5 to Adult        Visit Vitals  BP 97/61 (BP 1 Location: Left arm, BP Patient Position: Sitting)   Pulse 97   Temp 98.2 °F (36.8 °C) (Oral)   Resp 16   Ht 5' 1\" (1.549 m)   Wt 215 lb (97.5 kg)   SpO2 98%   BMI 40.62 kg/m²         Coordination of Care Questionnaire:  :   1) Have you been to an emergency room, urgent care, or hospitalized since your last visit? If yes, where when, and reason for visit? no       2. Have seen or consulted any other health care provider since your last visit? If yes, where when, and reason for visit? NO      3) Do you have an Advanced Directive/ Living Will in place? NO  If no, would you like information NO    Patient is accompanied by self I have received verbal consent from Cathy Lee to discuss any/all medical information while they are present in the room.

## 2019-10-11 NOTE — PROGRESS NOTES
Return OB Visit     Subjective:   Joseph David is a 27 y.o.  at 22w4d by LMP c/w 2nd trimester ultrasound. Estimated Date of Delivery: 2/10/20. [email or web address] interpretor#[id number]    LOF: No   Vaginal bleeding: No  Fetal movement (after 20 weeks): Yes   Contractions: No  Dysuria: Yes (see below)   Headaches, blurred vision, RUQ pain: No   Taking prenatal vitamins: Yes   Taking ASA: Yes  Taking ferrous sulfate: Yes, BID. Missed first IV infusion yesterday because she forgot and has so many appointments to keep track of. Constipation: No     Concerns today:  · Dysuria: has been going on for awhile, since the first time she was seen in clinic. She also has some vaginal itching. The Macrobid helped but the vaginal cream made her break out in a rash. She denies any odor or thick vaginal discharge. Allergies   No Known Allergies     Medications:   Current Outpatient Medications   Medication Sig    ascorbic acid, vitamin C, (VITAMIN C) 500 mg tablet Take  by mouth daily.  ferrous sulfate 325 mg (65 mg iron) tablet Take 1 Tab by mouth two (2) times daily (after meals).  senna-docusate (PERICOLACE) 8.6-50 mg per tablet Take 1 Tab by mouth daily.  doxylamine succinate (UNISOM) 25 mg tablet Take 1 Tab by mouth nightly as needed for Sleep.  pyridoxine, vitamin B6, (VITAMIN B-6) 25 mg tablet Take 1 Tab by mouth daily.  prenatal vit-calcium-iron-fa (PRENATAL PLUS WITH CALCIUM) 27 mg iron- 1 mg tab Take 1 Tab by mouth daily.  aspirin 81 mg chewable tablet Take 1 Tab by mouth daily.  3-DAY VAGINAL 2 % vaginal cream INSERT 1 APPLICATORFUL VAGINALLY NIGHTLY FOR 3 DAYS    ferrous sulfate 325 mg (65 mg iron) tablet Take 1 Tab by mouth three (3) times daily (with meals).  nitrofurantoin, macrocrystal-monohydrate, (MACROBID) 100 mg capsule Take 1 Cap by mouth two (2) times a day.  Indications: urinary tract infection due to E. coli bacteria    chlorhexidine (HIBICLENS) 4 % liquid Natalya hirsch el cuerpo y [place] para david minutos y enjuague. No poner en mike ojos or boca     No current facility-administered medications for this visit. Past Medical History:  History reviewed. No pertinent past medical history. Past Surgical History:   History reviewed. No pertinent surgical history. Social History:  Social History     Tobacco Use    Smoking status: Never Smoker    Smokeless tobacco: Never Used   Substance Use Topics    Alcohol use: Not Currently    Drug use: Never     Immunizations: There is no immunization history for the selected administration types on file for this patient. Objective:     Visit Vitals  BP 97/61 (BP 1 Location: Left arm, BP Patient Position: Sitting)   Pulse 97   Temp 98.2 °F (36.8 °C) (Oral)   Resp 16   Ht 5' 1\" (1.549 m)   Wt 215 lb (97.5 kg)   LMP 2019   SpO2 98%   BMI 40.62 kg/m²     Physical Exam  GENERAL APPEARANCE: alert, well appearing, in no apparent distress  ABDOMEN: gravid, fundal height 23 cm, FHT present at 152 bpm  PSYCH: normal mood and affect  : external vaginal exam performed when collecting G/C. No skin changes, lesions or rashes appreciated. Labs  Recent Results (from the past 12 hour(s))   AMB POC URINALYSIS DIP STICK AUTO W/O MICRO    Collection Time: 10/11/19  1:42 PM   Result Value Ref Range    Color (UA POC) Yellow     Clarity (UA POC) Slightly Cloudy     Glucose (UA POC) 3+ Negative    Bilirubin (UA POC) Negative Negative    Ketones (UA POC) Trace Negative    Specific gravity (UA POC) 1.025 1.001 - 1.035    Blood (UA POC) Negative Negative    pH (UA POC) 6.0 4.6 - 8.0    Protein (UA POC) Negative Negative    Urobilinogen (UA POC) 0.2 mg/dL 0.2 - 1    Nitrites (UA POC) Positive Negative    Leukocyte esterase (UA POC) Negative Negative       Assessment   Nunu Troncoso is a 27 y.o.  at 22w4d by LMP c/w 2nd trimester ultrasound, here for a return OB visit.  Estimated Date of Delivery: 2/10/20   Plan   SIUP   · Second trimester (12-28 wk): Presented late to care at 18w3d. · IOB labs: O positive. CBC with Hb 7.8. Nml adult Hb present. HepB neg. VZV/Rubella immune. Tpall negative. HIV NR. G/C pending. Pap NILM, negative HPV, yeast seen   · Failed early 1hr GTT, passed 3hr GTT  · Genetic Screening: undecided at initial OB visit  · MFM anatomy Scan 09/26: anterior placenta, normal anatomy. Schedule follow up MFM around 10/26-11/09  · Flu vaccine received 10/11/19  · G/C collected today    · Follow up visits scheduled     Other  · Severe Anemia: Follows with Dr. Angeli Smith for iron infusions. Continue Ferrous sulfate BID and bowel regimen  · AMA: continue daily ASA  · UTI: nitrites seen on U/A today. Sent for urine culture. Keflex 500mg BID for 5 days sent in to pharmacy. Treated at . [place] [address] with Sweetie Merino but patient continues to have symptoms. Also checking G/C today   · Hx of gestational diabetes: fourth pregnancy, 3hr GTT normal. Counseling patient on diet and weight control. --  Continuity Provider: Dr. Mukul Armijo  Pain mgmt. in labor: tbd  Feeding: tbd  Circ: tbd  Social: Lives with family   --    Labor precautions discussed, including: Regular painful contractions, lasting for greater than one hour, taking your breath away; any vaginal bleeding; any leakage of fluid; or absent or decreased fetal movement. Call M.D. on call if any of these symptoms or signs occur. I have discussed the diagnosis with the patient and the intended plan as seen in the above orders. The patient has received an after-visit summary and questions were answered concerning future plans. I have discussed medication side effects and warnings with the patient as well. Informed pt to return to the office or go to the ER if she experiences vaginal bleeding, vaginal discharge, leaking of fluid, pelvic cramping.     Patient discussed with Dr. Chichi Hernandez (Attending Physician)    Mukul Armijo MD  Family Medicine Resident

## 2019-10-11 NOTE — PATIENT INSTRUCTIONS
Semanas 22 a 26 de estevez embarazo: Instrucciones de cuidado - [ Gerry Ahnvic 22 to 26 of Your Pregnancy: Care Instructions ]  Instrucciones de cuidado    Al comenzar el 7.º mes de estevez embarazo en la semana 26, los pulmones de estevez bebé se están volviendo más carolina y se están preparando para respirar. Podría notar que estevez bebé responde al keven de estevez voz o la de estevez luis. También podría notar que estevez bebé se voltea y [place] menos de posición, y se retuerce o sacude más. Por lo general, las sacudidas indican que estevez bebé tiene hipo. Tener hipo es totalmente normal y dura poco tiempo. Howard vez sea buena idea pensar en asistir a kita clase de preparación para el parto. Aurelio es también un buen momento para comenzar a pensar si desea que robel el parto le administren un analgésico (medicamento para el dolor). A la mayoría de las mujeres embarazadas les hacen pruebas para la diabetes gestacional entre las semanas 24 y 29. La diabetes gestacional ocurre cuando el nivel de azúcar en la mata es muy alto robel el [facility]. La prueba es importante, porque usted puede tener diabetes gestacional y no saberlo. Solo esta enfermedad puede causarle problemas a estevez bebé. La atención de seguimiento es kita parte clave de estevez tratamiento y seguridad. Asegúrese de hacer y acudir a todas las citas, y llame a estevez médico si está teniendo problemas. También es kita buena idea saber los resultados de mike exámenes y mantener kita lista de los medicamentos que maria elena. ¿Cómo puede cuidarse en el hogar? Alivie las molestias de las patadas de estevez bebé  · Cambie de posición. A veces, aurelio cambio hace que estevez bebé también cambie de posición. · Respire hondo al mismo tiempo que levanta los brazos sobre estevez [place]. Después exhale a medida que baja los brazos. [place] los ejercicios de Kegel para evitar pérdidas de [facility]  · [employer] ejercicios de Kegel estando ritchie o sentada. ? Apriete los mismos músculos que usaría para detener el chorro de [place].  El abdomen y los muslos no deben moverse. ? Manténgalos apretados robel 3 segundos y, luego, relájelos otros 3 segundos. ? Empiece con 3 segundos. [place], añada 1 vijay cada semana hasta que sea capaz de apretar robel 10 segundos. ? Repita el ejercicio entre 10 y 13 veces [address] [place] luz o más sesiones cada día. Alivie o reduzca la hinchazón de los pies, los tobillos, las stella y los dedos  · Si tiene los dedos hinchados, quítese los [place]. · No coma alimentos con mucha sal, aleksandar bebeto fritas. · Coloque mike pies sobre un banco o un sofá todo el tiempo que le sea posible. Duerma con almohadas debajo de los pies. · No se quede de pie robel mucho tiempo ni use calzado ajustado. · Use medias elásticas de soporte. ¿Dónde puede encontrar más información en inglés? Sylvia Ocampo a http://pj-nasima.info/. Escriba G264 en la búsqueda para aprender más acerca de \"Semanas 22 a 26 de estevez embarazo: Instrucciones de cuidado - [ Moody Jaimes 22 to 26 of Your Pregnancy: Care Instructions ]. \"  Revisado: 29 hernandez, 2019  Versión del contenido: 12.2  © 7479-7270 Healthwise, Incorporated. Las instrucciones de cuidado fueron adaptadas bajo licencia por [employer] (which disclaims liability or warranty for this information). Si usted tiene [place] afección médica o sobre estas instrucciones, siempre pregunte a estevez profesional de keerthi. Healthwise, Incorporated niega toda garantía o responsabilidad por estevez uso de esta información.

## 2019-10-12 LAB
C TRACH RRNA SPEC QL NAA+PROBE: NEGATIVE
N GONORRHOEA RRNA SPEC QL NAA+PROBE: NEGATIVE

## 2019-10-14 NOTE — PROGRESS NOTES
I reviewed with the resident the medical history and the resident's findings on the physical examination. I discussed with the resident the patient's diagnosis and concur with the plan.     27 y.o. yo [id number] at 22w4d by first trimester US c/w LMP. Ann Peacock Pregnancy has complicated by anemia. Was evaluated by Heme, recommended IV iron but missed her appointment. +Dyuria, no fever, no chills. No bleeding, +FM      Results for orders placed or performed in visit on 10/11/19   AMB POC URINALYSIS DIP STICK AUTO W/O MICRO     Status: None   Result Value Ref Range Status    Color (UA POC) Yellow  Final    Clarity (UA POC) Slightly Cloudy  Final    Glucose (UA POC) 3+ Negative Final    Bilirubin (UA POC) Negative Negative Final    Ketones (UA POC) Trace Negative Final    Specific gravity (UA POC) 1.025 1.001 - 1.035 Final    Blood (UA POC) Negative Negative Final    pH (UA POC) 6.0 4.6 - 8.0 Final    Protein (UA POC) Negative Negative Final    Urobilinogen (UA POC) 0.2 mg/dL 0.2 - 1 Final    Nitrites (UA POC) Positive Negative Final    Leukocyte esterase (UA POC) Negative Negative Final           1. SIUP at 22w4d  -Prenatal labs: Blood type O positive, HIV/HepB NR, VZV immune,Rubella immune,  T pallidumNR,  1 hour GTT abnormal with normal 3 hour (1 abnormal value and 1 border line normal.   -Evaluated by MFM 0n 9/26/19. Recommended to f/u in 4-5 weeks. 2. Anemia in pregnancy. F/u with Heme for IV iron. 3. Dysuria with +nitrites on UA. Treat with Keflex. F/u on Ucx.

## 2019-10-15 LAB — BACTERIA UR CULT: ABNORMAL

## 2019-10-18 ENCOUNTER — HOSPITAL ENCOUNTER (OUTPATIENT)
Dept: INFUSION THERAPY | Age: 34
Discharge: HOME | End: 2019-10-18
Payer: SUBSIDIZED

## 2019-10-18 VITALS
OXYGEN SATURATION: 98 % | SYSTOLIC BLOOD PRESSURE: 98 MMHG | TEMPERATURE: 97.9 F | BODY MASS INDEX: 40.12 KG/M2 | DIASTOLIC BLOOD PRESSURE: 55 MMHG | HEIGHT: 61 IN | HEART RATE: 81 BPM | WEIGHT: 212.5 LBS | RESPIRATION RATE: 20 BRPM

## 2019-10-18 DIAGNOSIS — D50.0 IRON DEFICIENCY ANEMIA DUE TO CHRONIC BLOOD LOSS: Primary | ICD-10-CM

## 2019-10-18 PROCEDURE — 74011250636 HC RX REV CODE- 250/636: Performed by: NURSE PRACTITIONER

## 2019-10-18 PROCEDURE — 96365 THER/PROPH/DIAG IV INF INIT: CPT

## 2019-10-18 RX ORDER — SODIUM CHLORIDE 0.9 % (FLUSH) 0.9 %
10 SYRINGE (ML) INJECTION AS NEEDED
Status: ACTIVE | OUTPATIENT
Start: 2019-10-18 | End: 2019-10-18

## 2019-10-18 RX ORDER — SODIUM CHLORIDE 9 MG/ML
10 INJECTION INTRAMUSCULAR; INTRAVENOUS; SUBCUTANEOUS AS NEEDED
Status: ACTIVE | OUTPATIENT
Start: 2019-10-18 | End: 2019-10-18

## 2019-10-18 RX ORDER — HEPARIN 100 UNIT/ML
300-500 SYRINGE INTRAVENOUS AS NEEDED
Status: ACTIVE | OUTPATIENT
Start: 2019-10-18 | End: 2019-10-18

## 2019-10-18 RX ORDER — SODIUM CHLORIDE 9 MG/ML
25 INJECTION, SOLUTION INTRAVENOUS CONTINUOUS
Status: DISCONTINUED | OUTPATIENT
Start: 2019-10-18 | End: 2019-10-22 | Stop reason: HOSPADM

## 2019-10-18 RX ADMIN — SODIUM CHLORIDE 750 MG: 900 INJECTION, SOLUTION INTRAVENOUS at 10:36

## 2019-10-18 RX ADMIN — SODIUM CHLORIDE 25 ML/HR: 900 INJECTION, SOLUTION INTRAVENOUS at 10:36

## 2019-10-18 NOTE — PROGRESS NOTES
[facility] Progress Note    Date: 2019    Name: Clifford Prasad    MRN: [id number]         : 1985  1005:  Ms. Magdalena Moya Arrived ambulatory and in no distress for Dose 1 of 2  for Injectafer. Assessment was completed, no acute issues at this time, no new complaints voiced. 24 G PIV established to left hand without difficulty. Ms. Pillo Smith vitals were reviewed. Patient Vitals for the past 24 hrs:   Temp Pulse Resp BP SpO2   10/18/19 1133 97.9 °F (36.6 °C) 81 20 98/55 --   10/18/19 1004 97.8 °F (36.6 °C) 83 20 100/54 98 %       Medications:  NS KVO  Injectafer IV    Injectafer was infused at a rate of 400cc/hr due to the PIV placed in the hand. Ms. Magdalena Moya tolerated treatment well and was discharged from [place] [address] in stable condition . PIV flushed & removed. Discharge instructions reviewed with patient, verbalized understanding. She is to return on  at 1430 for her next appointment.     Zaria Lazaro RN  2019

## 2019-10-25 ENCOUNTER — HOSPITAL ENCOUNTER (OUTPATIENT)
Dept: INFUSION THERAPY | Age: 34
Discharge: HOME | End: 2019-10-25
Payer: SUBSIDIZED

## 2019-10-25 VITALS
SYSTOLIC BLOOD PRESSURE: 98 MMHG | HEART RATE: 85 BPM | TEMPERATURE: 98 F | DIASTOLIC BLOOD PRESSURE: 63 MMHG | BODY MASS INDEX: 40.08 KG/M2 | RESPIRATION RATE: 20 BRPM | WEIGHT: 212.1 LBS

## 2019-10-25 DIAGNOSIS — D50.0 IRON DEFICIENCY ANEMIA DUE TO CHRONIC BLOOD LOSS: Primary | ICD-10-CM

## 2019-10-25 PROCEDURE — 96365 THER/PROPH/DIAG IV INF INIT: CPT

## 2019-10-25 PROCEDURE — 74011250636 HC RX REV CODE- 250/636: Performed by: NURSE PRACTITIONER

## 2019-10-25 RX ORDER — SODIUM CHLORIDE 0.9 % (FLUSH) 0.9 %
10 SYRINGE (ML) INJECTION AS NEEDED
Status: DISCONTINUED | OUTPATIENT
Start: 2019-10-25 | End: 2019-10-26 | Stop reason: HOSPADM

## 2019-10-25 RX ORDER — SODIUM CHLORIDE 9 MG/ML
25 INJECTION, SOLUTION INTRAVENOUS CONTINUOUS
Status: DISCONTINUED | OUTPATIENT
Start: 2019-10-25 | End: 2019-10-26 | Stop reason: HOSPADM

## 2019-10-25 RX ADMIN — SODIUM CHLORIDE 750 MG: 900 INJECTION, SOLUTION INTRAVENOUS at 15:00

## 2019-10-25 NOTE — PROGRESS NOTES
[facility] Short Visit Progress Note    1430 Patient admitted to [facility] for 2/2 Injectafer ambulatory in stable condition. Assessment completed. No new concerns voiced. Vital Signs:  Visit Vitals  BP 98/63   Pulse 85   Temp 98 °F (36.7 °C)   Resp 20   Wt 96.2 kg (212 lb 1.6 oz)   LMP 05/06/2019   BMI 40.08 kg/m²         24 G PIV in left hand with positive blood return. Lab Results:  N/A        Medications:  Medications Administered     ferric carboxymaltose (INJECTAFER) 750 mg in 0.9% sodium chloride 250 mL IVPB     Admin Date  10/25/2019 Action  Given Dose  750 mg Rate  750 mL/hr Route  IntraVENous Administered By  Noris Harry RN                 Patient tolerated treatment well. Patient discharged from [place] [address] ambulatory in no distress at 1530. Patient aware of next appointment.     Future Appointments   Date Time Provider Justice Ulloa   10/28/2019  2:30 PM Paula Tripathi DO [facility] SCHED   10/31/2019  9:30 AM ULTRASOUND 3 [facility] KACYERI [facility]   11/11/2019  2:00 PM Nasim Gomez DO [facility] SCHED   11/25/2019 10:30 AM Nasim Gomez DO [facility] SCHED   12/6/2019  9:15 AM Lynn Lee,  [facility], [address]

## 2019-10-28 ENCOUNTER — ROUTINE PRENATAL (OUTPATIENT)
Dept: FAMILY MEDICINE CLINIC | Age: 34
End: 2019-10-28

## 2019-10-28 VITALS
OXYGEN SATURATION: 98 % | TEMPERATURE: 98 F | WEIGHT: 215 LBS | DIASTOLIC BLOOD PRESSURE: 57 MMHG | HEART RATE: 79 BPM | BODY MASS INDEX: 40.59 KG/M2 | SYSTOLIC BLOOD PRESSURE: 97 MMHG | HEIGHT: 61 IN | RESPIRATION RATE: 16 BRPM

## 2019-10-28 DIAGNOSIS — O09.90 SUPERVISION OF HIGH RISK PREGNANCY, ANTEPARTUM: Primary | ICD-10-CM

## 2019-10-28 LAB
BILIRUB UR QL STRIP: NEGATIVE
GLUCOSE UR-MCNC: NEGATIVE MG/DL
KETONES P FAST UR STRIP-MCNC: NORMAL MG/DL
PH UR STRIP: 6 [PH] (ref 4.6–8)
PROT UR QL STRIP: NEGATIVE
SP GR UR STRIP: 1.03 (ref 1–1.03)
UA UROBILINOGEN AMB POC: NORMAL (ref 0.2–1)
URINALYSIS CLARITY POC: NORMAL
URINALYSIS COLOR POC: NORMAL
URINE BLOOD POC: NEGATIVE
URINE LEUKOCYTES POC: NORMAL
URINE NITRITES POC: POSITIVE
WET MOUNT POCT, WMPOCT: NORMAL

## 2019-10-28 RX ORDER — MICONAZOLE NITRATE 2 %
1 CREAM WITH APPLICATOR VAGINAL
Qty: 45 G | Refills: 0 | Status: SHIPPED | OUTPATIENT
Start: 2019-10-28 | End: 2019-12-09 | Stop reason: ALTCHOICE

## 2019-10-28 RX ORDER — NITROFURANTOIN 25; 75 MG/1; MG/1
100 CAPSULE ORAL 2 TIMES DAILY
Qty: 14 CAP | Refills: 0 | Status: SHIPPED | OUTPATIENT
Start: 2019-10-28 | End: 2019-11-27 | Stop reason: ALTCHOICE

## 2019-10-28 NOTE — PROGRESS NOTES
Return OB Visit   Vaginal itchy dc. Objective:   BP 97/57 (BP 1 Location: Left arm, BP Patient Position: Sitting)   Pulse 79   Temp 98 °F (36.7 °C) (Oral)   Resp 16   Ht 5' 1\" (1.549 m)   Wt 215 lb (97.5 kg)   LMP 2019   SpO2 98%   BMI 40.62 kg/m²     See flowsheet   Assessment       ICD-10-CM ICD-9-CM    1. Supervision of high risk pregnancy, antepartum O09.90 V23.9 AMB POC URINALYSIS DIP STICK AUTO W/O MICRO     Plan   35yo  @ 25w0d  1. IUP: RH pos, s/p flu  2. LAY: followed by H/O, receiving iron infusions, last 10/25, on iron BID+bowel regimen   3. UTI: UA looks like a UTI. Unclear if   4. AMA: ASA  5. Morbid obesity: serial growth scans   6. Hx GDM: 1' GTT close to 200 and 3' very borderline. Given strong risk factors will have her keep a log. Suspect GDM, never had A1C but noticed after she was gone so will check next visit    7.   Vaginal discharge: yeast in [place], [place] to pharm     Orders Placed This Encounter    AMB POC URINALYSIS DIP STICK AUTO W/O MICRO         Cyndi Gomez DO

## 2019-10-28 NOTE — PROGRESS NOTES
Chief Complaint   Patient presents with    Routine Prenatal Visit     25w0d    Leakage of Fluid:Discharge that itches  Vaginal Bleeding: NO  Fetal Movement: YES  Prenatal vitamins: YES  Having Contractions: NO  Pain: NO    Visit Vitals  Resp 16   Ht 5' 1\" (1.549 m)   Wt 215 lb (97.5 kg)   LMP 05/06/2019   BMI 40.62 kg/m²

## 2019-10-28 NOTE — PATIENT INSTRUCTIONS
Ciática: Ejercicios - [ Sciatica: Exercises ]  Instrucciones de cuidado  Éstos son algunos ejemplos de ejercicios típicos de rehabilitación para estevez afección. Comience cada ejercicio lentamente. Reduzca la intensidad del ejercicio si Orville Poke a sentir dolor. Estevez médico o el fisioterapeuta le dirán cuándo puede comenzar con estos ejercicios y cuáles funcionarán mejor para usted. Cuando no esté activo, encuentre kita posición cómoda para descansar. Algunas personas se sienten cómodas en el piso o en kita cama de firmeza media con kita almohada pequeña debajo de la roshan y otra debajo de mike rodillas. Otras personas prefieren acostarse de lado con kita almohada entre las rodillas. No permanezca en kita posición por [facility]. Dé paseos cortos (10 a 20 minutos) cada 2 a 3 horas. Evite las pendientes, las colinas y las escaleras hasta que se sienta mejor. Sólo camine distancias que pueda manejar sin dolor, especialmente dolor en las piernas. Cómo se hacen los ejercicios  Estiramientos de la espalda    1. 100 [facility] y las rodillas en el piso. 2. Relaje la roshan y [facility]. Arquee la espalda hacia el techo, hasta que sienta que las partes nataliia, media y baja se estiran agradablemente. Mantenga aurelio estiramiento robel el tiempo que se sienta cómodo, o de 15 a 30 segundos. 3. Vuelva a la posición inicial con la espalda plana mientras está apoyado de stella y rodillas. 4. Deje que estevez espalda se nivele empujando el [place]. [address] (glúteos) hacia el techo. 5. Mantenga esta posición robel 15 a 30 segundos. 6. Repita de 2 a 4 veces. La atención de seguimiento es kita parte clave de estevez tratamiento y seguridad. Asegúrese de hacer y acudir a todas las citas, y llame a estevez médico si está teniendo problemas. También es kita buena idea saber los resultados de mike exámenes y mantener kita lista de los medicamentos que maria elena. ¿Dónde puede encontrar más información en inglés?   Valentino Fraise a [email or web address]-[email or web address].info/. Elise Ruff [id number] en la búsqueda para aprender más acerca de \"Ciática: Ejercicios - [ Sciatica: Exercises ]. \"  Revisado: 26 junio, 2019  Versión del contenido: 12.2  © 8908-1162 Healthwise, Incorporated. Las instrucciones de cuidado fueron adaptadas bajo licencia por [employer] (which disclaims liability or warranty for this information). Si usted tiene [place] afección médica o sobre estas instrucciones, siempre pregunte a diaz profesional de keerthi. Healthwise, Incorporated niega toda garantía o responsabilidad por diaz uso de esta información. Ciática: Instrucciones de cuidado - [ Sciatica: Care Instructions ]  Instrucciones de cuidado    La ciática es kita irritación de rekha de los nervios ciáticos, que salen de la médula mendoza en la parte baja de la espalda. Los nervios ciáticos y mike ramificaciones se extienden hacia abajo por la nalga hasta el pie. La ciática puede desarrollarse cuando un disco lesionado en la espalda ejerce presión contra la raíz de un nervio mendoza. Diaz síntoma principal es dolor, entumecimiento o debilidad que con frecuencia es peor en la pierna o el pie que en la espalda. A menudo la ciática mejora y desaparece con el paso del [place]. Un tratamiento temprano generalmente incluye medicamentos y ejercicios para aliviar el dolor. La atención de seguimiento es kita parte clave de diaz tratamiento y seguridad. Asegúrese de hacer y acudir a todas las citas, y llame a diaz médico si está teniendo problemas. También es kita buena idea saber los resultados de mike exámenes y mantener kita lista de los medicamentos que maria elena. ¿Cómo puede cuidarse en el hogar? · [employer] analgésicos (medicamentos para el dolor) exactamente aleksandar le fueron indicados. ? Si el médico le recetó un analgésico, tómelo según las indicaciones. ? Si no está tomando un analgésico recetado, pregúntele a diaz médico si puede oksana rekha de [employer].   · Utilice calor o frío para aliviar el dolor. ? Para aplicar calor, póngase kita bolsa de agua tibia, kita almohadilla térmica a baja temperatura o kita compresa tibia [employer]. No se vaya a dormir con kita almohadilla térmica sobre la piel. ? Para utilizar frío, póngase hielo o kita compresa fría sobre la koko robel un período de 10 a 20 minutos cada vez. Póngase un paño correa entre el hielo y la piel. · Evite sentarse si es posible, a menos que se sienta mejor que de pie. · Alterne entre recostarse y alex caminatas cortas. Aumente la distancia que camina tanto aleksandar pueda sin empeorar los síntomas. · No juaquin nada que empeore los síntomas. ¿Cuándo debe pedir ayuda? Llame al 911 en cualquier momento que considere que necesita atención de [place]. Por ejemplo, llame si:    · Es completamente incapaz de  kita pierna.    Llame a estevez médico ahora mismo o busque atención médica inmediata si:    · Tiene síntomas nuevos o peores en [address] (glúteos). Los síntomas pueden incluir:  ? Entumecimiento u hormigueo. ? Debilidad. ? Dolor.     · Pierde el control de la vejiga o del intestino.    Vigile de cerca los cambios en estevez keerthi y asegúrese de comunicarse con estevez médico si:    · No mejora aleksandar se esperaba. ¿Dónde puede encontrar más información en inglés? Evelyn Sos a [email or web address]-nasima.info/. Evelyn Pollen [id number] en la búsqueda para aprender más acerca de \"Ciática: Instrucciones de cuidado - [ Sciatica: Care Instructions ]. \"  Revisado: 26 bharath, 2019  Versión del contenido: 12.2  © 8809-5618 Healthwise, Incorporated. Las instrucciones de cuidado fueron adaptadas bajo licencia por [employer] (which disclaims liability or warranty for this information). Si usted tiene [place] afección médica o sobre estas instrucciones, siempre pregunte a estevez profesional de keerthi. Healthwise, Incorporated niega toda garantía o responsabilidad por estevez uso de esta información.

## 2019-10-31 ENCOUNTER — HOSPITAL ENCOUNTER (OUTPATIENT)
Dept: PERINATAL CARE | Age: 34
Discharge: HOME | End: 2019-10-31
Attending: OBSTETRICS & GYNECOLOGY
Payer: SUBSIDIZED

## 2019-10-31 LAB — BACTERIA UR CULT: ABNORMAL

## 2019-10-31 PROCEDURE — 76816 OB US FOLLOW-UP PER FETUS: CPT | Performed by: OBSTETRICS & GYNECOLOGY

## 2019-11-11 ENCOUNTER — HOSPITAL ENCOUNTER (OUTPATIENT)
Dept: LAB | Age: 34
Discharge: HOME | End: 2019-11-11

## 2019-11-11 ENCOUNTER — ROUTINE PRENATAL (OUTPATIENT)
Dept: FAMILY MEDICINE CLINIC | Age: 34
End: 2019-11-11

## 2019-11-11 VITALS
RESPIRATION RATE: 16 BRPM | BODY MASS INDEX: 40.97 KG/M2 | OXYGEN SATURATION: 98 % | SYSTOLIC BLOOD PRESSURE: 97 MMHG | HEIGHT: 61 IN | TEMPERATURE: 98.1 F | HEART RATE: 82 BPM | DIASTOLIC BLOOD PRESSURE: 58 MMHG | WEIGHT: 217 LBS

## 2019-11-11 DIAGNOSIS — O09.90 SUPERVISION OF HIGH RISK PREGNANCY, ANTEPARTUM: Primary | ICD-10-CM

## 2019-11-11 DIAGNOSIS — R73.09 GLUCOSE TOLERANCE TEST ABNORMAL: ICD-10-CM

## 2019-11-11 DIAGNOSIS — D50.9 IRON DEFICIENCY ANEMIA DURING PREGNANCY: ICD-10-CM

## 2019-11-11 DIAGNOSIS — O99.019 IRON DEFICIENCY ANEMIA DURING PREGNANCY: ICD-10-CM

## 2019-11-11 DIAGNOSIS — O09.90 SUPERVISION OF HIGH RISK PREGNANCY, ANTEPARTUM: ICD-10-CM

## 2019-11-11 LAB
BILIRUB UR QL STRIP: NEGATIVE
GLUCOSE POC: 183 MG/DL
GLUCOSE UR-MCNC: NEGATIVE MG/DL
HBA1C MFR BLD HPLC: 5 %
HGB BLD-MCNC: 11.3 G/DL
KETONES P FAST UR STRIP-MCNC: NEGATIVE MG/DL
PH UR STRIP: 5.5 [PH] (ref 4.6–8)
PROT UR QL STRIP: NORMAL
SP GR UR STRIP: 1.03 (ref 1–1.03)
UA UROBILINOGEN AMB POC: NORMAL (ref 0.2–1)
URINALYSIS CLARITY POC: NORMAL
URINALYSIS COLOR POC: YELLOW
URINE BLOOD POC: NORMAL
URINE LEUKOCYTES POC: NORMAL
URINE NITRITES POC: POSITIVE

## 2019-11-11 NOTE — PROGRESS NOTES
Chief Complaint   Patient presents with    Routine Prenatal Visit     27w0d    Leakage of Fluid: NO  Vaginal Bleeding: NO  Fetal Movement: YES  Prenatal vitamins: YES  Having Contractions: NO  Pain: NO    Visit Vitals  Resp 16   Ht 5' 1\" (1.549 m)   Wt 217 lb (98.4 kg)   LMP 05/06/2019   BMI 41.00 kg/m²

## 2019-11-11 NOTE — PROGRESS NOTES
Return OB Visit   Hasn't started keeping a log yet, hasn't bought a meter. Objective:   BP 97/58 (BP 1 Location: Left arm, BP Patient Position: Sitting)   Pulse 82   Temp 98.1 °F (36.7 °C) (Oral)   Resp 16   Ht 5' 1\" (1.549 m)   Wt 217 lb (98.4 kg)   LMP 2019   SpO2 98%   BMI 41.00 kg/m²     See flowsheet   Assessment       ICD-10-CM ICD-9-CM    1. Supervision of high risk pregnancy, antepartum O09.90 V23.9 AMB POC URINALYSIS DIP STICK AUTO W/O MICRO      CULTURE, URINE      AMB POC HEMOGLOBIN (HGB)      AMB POC HEMOGLOBIN A1C      AMB POC GLUCOSE BLOOD, BY GLUCOSE MONITORING DEVICE   2. Iron deficiency anemia during pregnancy O99.019 648.20     D50.9 280.9    3. Glucose tolerance test abnormal R73.09 790.22      Plan   33yo  @ 27w0d  1. IUP: RH pos, s/p flu  2. LAY: followed by H/O, receiving iron infusions, last 10/25, on iron BID+bowel regimen. POC hbg 11.3.    3.  UTI: BRENDAN today   4. AMA: ASA  5. Morbid obesity: serial growth scans   6. Hx GDM: 1' GTT close to 200 and 3' very borderline. Given strong risk factors will have her keep a log. Suspect GDM, A1C 5.0.   Random 183 (pt ate approx 1.5 hours prior)    Orders Placed This Encounter    CULTURE, URINE     Standing Status:   Future     Standing Expiration Date:   2020    AMB POC URINALYSIS DIP STICK AUTO W/O MICRO    AMB POC HEMOGLOBIN (HGB)    AMB POC HEMOGLOBIN A1C         Cyndi Gomez DO

## 2019-11-12 NOTE — PROGRESS NOTES
02/10/20  Remaining anatomy normal.  AMA, obesity, LAY s/p iron infusion x 2. Avoid additional infusion until 34 weeks.   Hx of GDM

## 2019-11-14 ENCOUNTER — TELEPHONE (OUTPATIENT)
Dept: FAMILY MEDICINE CLINIC | Age: 34
End: 2019-11-14

## 2019-11-14 DIAGNOSIS — O09.90 SUPERVISION OF HIGH RISK PREGNANCY, ANTEPARTUM: Primary | ICD-10-CM

## 2019-11-14 LAB
BACTERIA SPEC CULT: ABNORMAL
CC UR VC: ABNORMAL
SERVICE CMNT-IMP: ABNORMAL

## 2019-11-14 RX ORDER — CEPHALEXIN 500 MG/1
500 CAPSULE ORAL 4 TIMES DAILY
Qty: 28 CAP | Refills: 0 | Status: SHIPPED | OUTPATIENT
Start: 2019-11-14 | End: 2019-11-21

## 2019-11-14 NOTE — TELEPHONE ENCOUNTER
Called patient using G[employer] App  [id number] to inform her of UTI and antibiotics sent to pharmacy. Patient verbalized understanding.

## 2019-11-15 ENCOUNTER — ROUTINE PRENATAL (OUTPATIENT)
Dept: FAMILY MEDICINE CLINIC | Age: 34
End: 2019-11-15

## 2019-11-15 VITALS
WEIGHT: 213 LBS | BODY MASS INDEX: 40.22 KG/M2 | DIASTOLIC BLOOD PRESSURE: 66 MMHG | RESPIRATION RATE: 16 BRPM | HEART RATE: 77 BPM | TEMPERATURE: 98.1 F | SYSTOLIC BLOOD PRESSURE: 100 MMHG | HEIGHT: 61 IN | OXYGEN SATURATION: 98 %

## 2019-11-15 DIAGNOSIS — R11.0 NAUSEA: ICD-10-CM

## 2019-11-15 DIAGNOSIS — Z34.80 SUPERVISION OF OTHER NORMAL PREGNANCY: ICD-10-CM

## 2019-11-15 DIAGNOSIS — R19.7 DIARRHEA, UNSPECIFIED TYPE: Primary | ICD-10-CM

## 2019-11-15 LAB
BILIRUB UR QL STRIP: NEGATIVE
GLUCOSE UR-MCNC: NEGATIVE MG/DL
KETONES P FAST UR STRIP-MCNC: NORMAL MG/DL
PH UR STRIP: 6 [PH] (ref 4.6–8)
PROT UR QL STRIP: NORMAL
SP GR UR STRIP: 1.02 (ref 1–1.03)
UA UROBILINOGEN AMB POC: NORMAL (ref 0.2–1)
URINALYSIS CLARITY POC: NORMAL
URINALYSIS COLOR POC: YELLOW
URINE BLOOD POC: NORMAL
URINE LEUKOCYTES POC: NORMAL
URINE NITRITES POC: POSITIVE

## 2019-11-15 NOTE — PROGRESS NOTES
Chief Complaint   Patient presents with    Nausea     x 2 days     Diarrhea     x 2 days     Blood pressure 100/66, pulse 77, temperature 98.1 °F (36.7 °C), temperature source Oral, resp. rate 16, height 5' 1\" (1.549 m), weight 213 lb (96.6 kg), last menstrual period 05/06/2019, SpO2 98 %. 1. Have you been to the ER, urgent care clinic since your last visit? Hospitalized since your last visit? No    2. Have you seen or consulted any other health care providers outside of the [address] [place] [address] since your last visit? Include any pap smears or colon screening. No    Patient denies any bleeding,cramping or leakage of fluid. + fetal movement.     [facility]  [phone]

## 2019-11-15 NOTE — PATIENT INSTRUCTIONS
Diarrea: Instrucciones de cuidado - [ Diarrhea: Care Instructions ]  Instrucciones de cuidado    La diarrea es la evacuación de heces flojas y acuosas. Con frecuencia, la causa exacta es difícil de determinar. A veces, la diarrea es la manera que tiene el cuerpo de eliminar lo que le causó malestar estomacal. Los virus, la intoxicación por alimentos y muchos medicamentos pueden provocar diarrea. Algunas personas tienen diarrea aleksandar respuesta al estrés emocional, la ansiedad o determinados alimentos. Denia todos tenemos diarrea de vez en cuando. Por lo general, no es grave y las heces regresarán pronto a la normalidad. Lo importante es que debe reponer los líquidos perdidos para que pueda prevenir la deshidratación. El médico lo louis revisado minuciosamente, raquel se pueden presentar problemas más tarde. Si nota algún problema o síntomas nuevos, busque tratamiento médico inmediatamente. La atención de seguimiento es kita parte clave de estevez tratamiento y seguridad. Asegúrese de hacer y acudir a todas las citas, y llame a estevez médico si está teniendo problemas. También es kita buena idea saber los resultados de mike exámenes y mantener kita lista de los medicamentos que maria elena. ¿Cómo puede cuidarse en el hogar? · Esté atento a señales de deshidratación, lo que significa que estevez cuerpo louis perdido [facility]. La deshidratación es un problema médico grave y debe tratarse de inmediato. Las señales de la deshidratación son:  ? Aumento de sed y sequedad de la boca y los ojos. ? Sensación de desmayo o aturdimiento. ? Dk cantidad de [place] de la normal.  · Para prevenir la deshidratación, camille abundantes líquidos. Elija beber agua y otros líquidos adriana sin cafeína hasta que se sienta mejor. Si tiene [place] [employer] [facility], del corazón o del hígado y tiene que [place]'s líquidos, hable con estevez médico antes de aumentar la cantidad de líquidos que maddie.   · Comience ingiriendo cantidades pequeñas de alimentos suaves al día siguiente, si es que le apetece. ? Pruebe con yogur que tenga cultivos vivos de lactobacilos. (Quynh la etiqueta). ? Evite las comidas muy condimentadas, las frutas, el alcohol y la cafeína hasta 50 horas después de que desaparezcan todos los síntomas. ? Evite los chicles que contengan sorbitol. ? Evite los productos lácteos (excepto el yogur con lactobacilos) mientras tenga diarrea y robel 3 días después de que hayan desaparecido los síntomas. · El médico podría recomendarle que tome medicamentos de venta ade, aleksandar loperamida (Imodium), si persiste la diarrea después de 6 horas. Quynh y siga todas las instrucciones de la [place]. No use aurelio medicamento si tiene diarrea sanguinolenta (con mata), fiebre nataliia u otras señales de enfermedad grave. Llame a estevez médico si jeferson estar teniendo problemas con estevez medicamento. ¿Cuándo debe pedir ayuda? Llame al 911 en cualquier momento que considere que necesita atención de [place]. Por ejemplo, llame si:    · Se desmayó (perdió el conocimiento).   · Las heces son de color rojizo o muy sanguinolentas (con mata).    Llame a estevez médico ahora mismo o busque atención médica inmediata si:    · Siente mareo o aturdimiento, o siente que se va a desmayar.     · [place] heces son negruzcas y parecidas al alquitrán o tienen rastros de mata.     · Tiene dolor abdominal nuevo o peor.     · Tiene síntomas de deshidratación, tales aleksandar:  ? [place] y ojos secos. ? [place] orina de color oscuro. ? Tener más sed de lo normal.     · Tiene fiebre nueva o más nataliia.    Preste especial atención a los cambios en estevez keerthi y asegúrese de comunicarse con estevez médico si:    · La diarrea está empeorando.     · Ve pus en la diarrea.     · No está mejorando después de 2 días (48 horas). ¿Dónde puede encontrar más información en inglés? Valentino Fraise a http://pj-nasima.info/. Beverley Morgan O123 en la búsqueda para aprender Espinoza Monzon de \"Diarrea:  Instrucciones de cuidado - [ Diarrhea: Care Instructions ]. \"  Revisado: 26 junio, 2019  Versión del contenido: 12.2  © 0399-2945 Healthwise, Incorporated. Las instrucciones de cuidado fueron adaptadas bajo licencia por [employer] (which disclaims liability or warranty for this information). Si usted tiene [place] afección médica o sobre estas instrucciones, siempre pregunte a estevez profesional de keerthi. Healthwise, Incorporated niega toda garantía o responsabilidad por estevez uso de esta información. Náuseas y vómito: Instrucciones de cuidado - [ Nausea and Vomiting: Care Instructions ]  Instrucciones de cuidado    Cuando tiene náuseas, podría sentirse débil y sudoroso y notar mucha saliva en estevez boca. Las náuseas suelen [employer]. La mayoría de las veces no hay que preocuparse por las náuseas y [address], raquel estos pueden ser signos de [facility]. Dos causas comunes de náuseas y vómito son la gastroenteritis viral y la intoxicación por alimentos. Las náuseas y el vómito por gastroenteritis viral, por lo general, empiezan a mejorar en unas 24 horas. Las náuseas y el vómito debido a intoxicación por alimentos podrían durar de 12 a 48 horas. El médico lo ha revisado minuciosamente, raquel puede desarrollar problemas más tarde. Si nota algún problema o síntomas nuevos, busque tratamiento médico inmediatamente. La atención de seguimiento es kita parte clave de estevez tratamiento y seguridad. Asegúrese de hacer y acudir a todas las citas, y llame a estevez médico si está teniendo problemas. También es kita buena idea saber los resultados de mike exámenes y mantener kita lista de los medicamentos que maria elena. ¿Cómo puede cuidarse en el hogar? · Para prevenir la deshidratación, camille abundantes líquidos, suficientes para que estevez orina sea de color amarillo kassandra o darius aleksandar el agua. Opte por beber agua y otros líquidos adriana sin cafeína hasta que se sienta mejor.  Si tiene [facility] enfermedad del [place], del corazón o del hígado y tiene que limitar los líquidos, hable con estevez médico antes de aumentar estevez consumo. · Permanezca en la cama hasta que se sienta mejor. · Cuando pueda comer, empiece a consumir sopas claras (caldos), alimentos suaves y líquidos hasta que todos los síntomas hayan desaparecido por un período de 12 a 48 horas. Otras elecciones buenas incluyen pan tammy seco, galletas saladas, cereal cocido y postre de gelatina, aleksandar Jell-O.  ¿Cuándo debe pedir ayuda? Llame al 911 toda vez que piense que puede necesitar atención de emergencia. Por ejemplo, llame si:    · Se desmayó (perdió el conocimiento).    Llame a estevez médico ahora mismo o busque atención médica inmediata si:    · Tiene síntomas de deshidratación, tales aleksandar:  ? Ojos secos y boca seca. ? Orina solo poca cantidad de color oscuro. ? Tiene más sed de lo normal.     · Tiene dolor abdominal nuevo o que empeora.     · Tiene fiebre nueva o que [place].     · Vomita mata o algo parecido a granos de café molido.    Preste especial atención a los cambios en estevez keerthi y asegúrese de comunicarse con estevez médico si:    · Tiene náusea y vómito continuos.     · El vómito está empeorando.     · El vómito dura más de 2 días.     · No mejora aleksandar se esperaba. ¿Dónde puede encontrar más información en inglés? Alma Orourke a [email or web address]-nasima.info/. [facility] H591 en la búsqueda para aprender más acerca de \"Náuseas y vómito: Instrucciones de cuidado - [ Nausea and Vomiting: Care Instructions ]. \"  Revisado: 26 bharath, 2019  Versión del contenido: 12.2  © 3866-0550 Healthwise, Incorporated. Las instrucciones de cuidado fueron adaptadas bajo licencia por [employer] (which disclaims liability or warranty for this information). Si usted tiene [place] afección médica o sobre estas instrucciones, siempre pregunte a estevez profesional de keerthi. Healthwise, Incorporated niega toda garantía o responsabilidad por estevez uso de esta información.

## 2019-11-15 NOTE — PROGRESS NOTES
HPI       Chief Complaint   Patient presents with    Nausea     x 2 days     Diarrhea     x 2 days       Mayelin Schmid is a 27 y.o. female who presents for nausea and diarrhea  She is a [id number] at 27w4d by LMP. Pregnancy complicated by iron deficiency anemia, obesity, AMA, h/o GDM  2 days ago she started having diarrhea that is dark brown almost black and watery. No blood in stool. Wednesday night she thinks she had like 10 episodes, yesterday only had 3 but today hasn't had any diarrhea. She then started having nausea yesterday. No vomiting. She took some pills at home for the nausea   She has not eaten anything out of the ordinary  Has some nasal congestion, sneezing   Wednesday she felt warm but denies fever, chills, ear pain, headache   She was diagnosed with UTI on 11/11. She have Keflex with her but haven't started taking it yet     Baby moving, no bleeding, no LOF    PMHx-reviewed:  History reviewed. No pertinent past medical history. Meds-reviewed:   Current Outpatient Medications   Medication Sig Dispense Refill    cephALEXin (KEFLEX) 500 mg capsule Take 1 Cap by mouth four (4) times daily for 7 days. 28 Cap 0    ascorbic acid, vitamin C, (VITAMIN C) 500 mg tablet Take  by mouth daily.  ferrous sulfate 325 mg (65 mg iron) tablet Take 1 Tab by mouth two (2) times daily (after meals). 60 Tab 3    senna-docusate (PERICOLACE) 8.6-50 mg per tablet Take 1 Tab by mouth daily. 60 Tab 3    doxylamine succinate (UNISOM) 25 mg tablet Take 1 Tab by mouth nightly as needed for Sleep. 30 Tab 3    pyridoxine, vitamin B6, (VITAMIN B-6) 25 mg tablet Take 1 Tab by mouth daily. 30 Tab 3    prenatal vit-calcium-iron-fa (PRENATAL PLUS WITH CALCIUM) 27 mg iron- 1 mg tab Take 1 Tab by mouth daily. 30 Tab 3    aspirin 81 mg chewable tablet Take 1 Tab by mouth daily. 30 Tab 3    nitrofurantoin, macrocrystal-monohydrate, (MACROBID) 100 mg capsule Take 1 Cap by mouth two (2) times a day.  Indications: Staphylococcus saprophyticus infection of urinary tract 14 Cap 0    miconazole (MICOTIN) 2 % vaginal cream Insert 1 Applicator into vagina nightly. 45 g 0     Allergies-reviewed:   No Known Allergies    Smoker-reviewed:  Social History     Tobacco Use   Smoking Status Never Smoker   Smokeless Tobacco Never Used     ETOH-reviewed:   Social History     Substance and Sexual Activity   Alcohol Use Not Currently     FH-reviewed:   History reviewed. No pertinent family history. ROS:  Review of Systems   Constitutional: Negative. HENT: Positive for congestion. Respiratory: Negative. Cardiovascular: Negative. Gastrointestinal: Positive for diarrhea and nausea. Negative for abdominal pain and vomiting. Skin: Negative. Physical Exam:  Visit Vitals  /66   Pulse 77   Temp 98.1 °F (36.7 °C) (Oral)   Resp 16   Ht 5' 1\" (1.549 m)   Wt 213 lb (96.6 kg)   LMP 05/06/2019   SpO2 98%   BMI 40.25 kg/m²       Wt Readings from Last 3 Encounters:   11/15/19 213 lb (96.6 kg)   11/11/19 217 lb (98.4 kg)   10/28/19 215 lb (97.5 kg)     BP Readings from Last 3 Encounters:   11/15/19 100/66   11/11/19 97/58   10/28/19 97/57      Physical Exam   Constitutional: She appears well-developed and well-nourished. No distress. HENT:   Right Ear: External ear normal.   Left Ear: External ear normal.   Mouth/Throat: Oropharynx is clear and moist. No oropharyngeal exudate. Cardiovascular: Normal rate and regular rhythm. No murmur heard. Pulmonary/Chest: Effort normal and breath sounds normal. She has no wheezes. Abdominal: Soft. Bowel sounds are normal. There is no tenderness. FHT 140s, FH 31   Skin: Skin is warm and dry. No rash noted.      I personally reviewed the following lab results:   Recent Results (from the past 12 hour(s))   AMB POC URINALYSIS DIP STICK AUTO W/O MICRO    Collection Time: 11/15/19 10:27 AM   Result Value Ref Range    Color (UA POC) Yellow     Clarity (UA POC) Cloudy     Glucose (UA POC) Negative Negative    Bilirubin (UA POC) Negative Negative    Ketones (UA POC) 2+ Negative    Specific gravity (UA POC) 1.025 1.001 - 1.035    Blood (UA POC) Trace Negative    pH (UA POC) 6.0 4.6 - 8.0    Protein (UA POC) 1+ Negative    Urobilinogen (UA POC) 1 mg/dL 0.2 - 1    Nitrites (UA POC) Positive Negative    Leukocyte esterase (UA POC) 1+ Negative             Assessment     27 y.o. female with:    ICD-10-CM ICD-9-CM    1. Diarrhea, unspecified type R19.7 787.91    2. Nausea R11.0 787.02    3. Supervision of other normal pregnancy Z34.80 V22.1 AMB POC URINALYSIS DIP STICK AUTO W/O MICRO              Plan       Orders Placed This Encounter    AMB POC URINALYSIS DIP STICK AUTO W/O MICRO     - Diarrhea likely 2/2 viral gastroenteritis. Discussed with patient the importance of keep herself hydrated. Urine with ketones present.   - Continue taking nausea medicine. Patient has been prescribed B6 and Unisom but she doesn't remember which she has been taking  - Will have her come back 11/18 for close follow up  - Precautions given     Patient discussed with Dr. Radha Huynh    I have discussed the diagnosis with the patient and the intended plan as seen in the above orders. The patient has received an after-visit summary and questions were answered concerning future plans. I have discussed medication side effects and warnings with the patient as well.     Inez Saenz MD  Family Medicine Resident  PGY-3

## 2019-11-18 ENCOUNTER — ROUTINE PRENATAL (OUTPATIENT)
Dept: FAMILY MEDICINE CLINIC | Age: 34
End: 2019-11-18

## 2019-11-18 VITALS
TEMPERATURE: 98.2 F | DIASTOLIC BLOOD PRESSURE: 56 MMHG | SYSTOLIC BLOOD PRESSURE: 94 MMHG | BODY MASS INDEX: 40.97 KG/M2 | HEART RATE: 79 BPM | HEIGHT: 61 IN | WEIGHT: 217 LBS | OXYGEN SATURATION: 97 % | RESPIRATION RATE: 16 BRPM

## 2019-11-18 DIAGNOSIS — R73.09 GLUCOSE TOLERANCE TEST ABNORMAL: ICD-10-CM

## 2019-11-18 DIAGNOSIS — D50.9 IRON DEFICIENCY ANEMIA DURING PREGNANCY: ICD-10-CM

## 2019-11-18 DIAGNOSIS — O09.90 SUPERVISION OF HIGH RISK PREGNANCY, ANTEPARTUM: Primary | ICD-10-CM

## 2019-11-18 DIAGNOSIS — O99.019 IRON DEFICIENCY ANEMIA DURING PREGNANCY: ICD-10-CM

## 2019-11-18 LAB
BILIRUB UR QL STRIP: NORMAL
GLUCOSE UR-MCNC: NEGATIVE MG/DL
KETONES P FAST UR STRIP-MCNC: NORMAL MG/DL
PH UR STRIP: 6 [PH] (ref 4.6–8)
PROT UR QL STRIP: NORMAL
SP GR UR STRIP: 1.03 (ref 1–1.03)
UA UROBILINOGEN AMB POC: NORMAL (ref 0.2–1)
URINALYSIS CLARITY POC: NORMAL
URINALYSIS COLOR POC: NORMAL
URINE BLOOD POC: NEGATIVE
URINE LEUKOCYTES POC: NORMAL
URINE NITRITES POC: NEGATIVE

## 2019-11-18 NOTE — PROGRESS NOTES
Chief Complaint   Patient presents with    Routine Prenatal Visit     28w0d    Leakage of Fluid: NO  Vaginal Bleeding: NO  Fetal Movement: YES  Prenatal vitamins: YES  Having Contractions: NO  Pain: NO    Visit Vitals  BP 94/56 (BP 1 Location: Left arm, BP Patient Position: Sitting)   Pulse 79   Temp 98.2 °F (36.8 °C) (Oral)   Resp 16   Ht 5' 1\" (1.549 m)   Wt 217 lb (98.4 kg)   LMP 05/06/2019   SpO2 97%   BMI 41.00 kg/m²     Immunization History   Administered Date(s) Administered    Influenza Vaccine (Quad) PF 10/11/2019    Tdap 11/18/2019

## 2019-11-18 NOTE — PROGRESS NOTES
Return OB Visit   Seen on Friday for AGE and was told to f/up today to be sure she was getting better. Sx resolved. Brings in a log with most values at goal, a handful above goal.      Objective:   BP 94/56 (BP 1 Location: Left arm, BP Patient Position: Sitting)   Pulse 79   Temp 98.2 °F (36.8 °C) (Oral)   Resp 16   Ht 5' 1\" (1.549 m)   Wt 217 lb (98.4 kg)   LMP 2019   SpO2 97%   BMI 41.00 kg/m²     See flowsheet   Assessment       ICD-10-CM ICD-9-CM    1. Supervision of high risk pregnancy, antepartum O09.90 V23.9 AMB POC URINALYSIS DIP STICK AUTO W/O MICRO      TETANUS, DIPHTHERIA TOXOIDS AND ACELLULAR PERTUSSIS VACCINE (TDAP), IN INDIVIDS. >=7, IM      DE IMMUNIZ ADMIN,1 SINGLE/COMB VAC/TOXOID   2. Iron deficiency anemia during pregnancy O99.019 648.20     D50.9 280.9    3. Glucose tolerance test abnormal R73.09 790.22      Plan   35yo  @ 28w0d  1. IUP: RH pos, s/p flu and tdap  2. LAY: followed by H/O, receiving iron infusions, last 10/25, on iron BID+bowel regimen. POC hbg 11.3.    3.  UTI: still has not cleared, more abx sent to pharmacy  4. AMA: ASA  5. Morbid obesity: serial growth scans   6. Hx GDM: 1' GTT close to 200 and 3' very borderline. Given strong risk factors will have her keep a log. Suspect GDM, A1C 5.0.   Log mostly at goal.      Orders Placed This Encounter    DE IMMUNIZ ADMIN,1 SINGLE/COMB VAC/TOXOID    TETANUS, DIPHTHERIA TOXOIDS AND ACELLULAR PERTUSSIS VACCINE (TDAP), IN INDIVIDS. >=7, IM    AMB POC URINALYSIS DIP STICK AUTO W/O MICRO         Cyndi Gomez, DO

## 2019-11-25 ENCOUNTER — ROUTINE PRENATAL (OUTPATIENT)
Dept: FAMILY MEDICINE CLINIC | Age: 34
End: 2019-11-25

## 2019-11-25 ENCOUNTER — HOSPITAL ENCOUNTER (OUTPATIENT)
Dept: LAB | Age: 34
Discharge: HOME | End: 2019-11-25

## 2019-11-25 VITALS
WEIGHT: 215 LBS | SYSTOLIC BLOOD PRESSURE: 93 MMHG | RESPIRATION RATE: 16 BRPM | OXYGEN SATURATION: 98 % | BODY MASS INDEX: 40.59 KG/M2 | HEIGHT: 61 IN | HEART RATE: 83 BPM | DIASTOLIC BLOOD PRESSURE: 52 MMHG | TEMPERATURE: 98.1 F

## 2019-11-25 DIAGNOSIS — O99.019 IRON DEFICIENCY ANEMIA DURING PREGNANCY: ICD-10-CM

## 2019-11-25 DIAGNOSIS — R73.09 GLUCOSE TOLERANCE TEST ABNORMAL: ICD-10-CM

## 2019-11-25 DIAGNOSIS — D50.8 IRON DEFICIENCY ANEMIA DUE TO DIETARY CAUSES: ICD-10-CM

## 2019-11-25 DIAGNOSIS — L29.9 ITCHING: ICD-10-CM

## 2019-11-25 DIAGNOSIS — O09.90 SUPERVISION OF HIGH RISK PREGNANCY, ANTEPARTUM: ICD-10-CM

## 2019-11-25 DIAGNOSIS — O09.90 SUPERVISION OF HIGH RISK PREGNANCY, ANTEPARTUM: Primary | ICD-10-CM

## 2019-11-25 DIAGNOSIS — D50.9 IRON DEFICIENCY ANEMIA DURING PREGNANCY: ICD-10-CM

## 2019-11-25 LAB
ALBUMIN SERPL-MCNC: 3.1 G/DL (ref 3.5–5)
ALBUMIN/GLOB SERPL: 0.9 {RATIO} (ref 1.1–2.2)
ALP SERPL-CCNC: 132 U/L (ref 45–117)
ALT SERPL-CCNC: 40 U/L (ref 12–78)
ANION GAP SERPL CALC-SCNC: 6 MMOL/L (ref 5–15)
AST SERPL-CCNC: 16 U/L (ref 15–37)
BILIRUB SERPL-MCNC: 0.3 MG/DL (ref 0.2–1)
BILIRUB UR QL STRIP: NEGATIVE
BUN SERPL-MCNC: 8 MG/DL (ref 6–20)
BUN/CREAT SERPL: 14 (ref 12–20)
CALCIUM SERPL-MCNC: 8.6 MG/DL (ref 8.5–10.1)
CHLORIDE SERPL-SCNC: 108 MMOL/L (ref 97–108)
CO2 SERPL-SCNC: 23 MMOL/L (ref 21–32)
CREAT SERPL-MCNC: 0.56 MG/DL (ref 0.55–1.02)
FERRITIN SERPL-MCNC: 113 NG/ML (ref 26–388)
GLOBULIN SER CALC-MCNC: 3.6 G/DL (ref 2–4)
GLUCOSE SERPL-MCNC: 79 MG/DL (ref 65–100)
GLUCOSE UR-MCNC: NEGATIVE MG/DL
HCT VFR BLD AUTO: 38 % (ref 35–47)
HGB BLD-MCNC: 11.5 G/DL (ref 11.5–16)
IRON SATN MFR SERPL: 18 % (ref 20–50)
IRON SERPL-MCNC: 69 UG/DL (ref 35–150)
KETONES P FAST UR STRIP-MCNC: NEGATIVE MG/DL
MCH RBC QN AUTO: 25.1 PG (ref 26–34)
MCHC RBC AUTO-ENTMCNC: 30.3 G/DL (ref 30–36.5)
MCV RBC AUTO: 82.8 FL (ref 80–99)
NRBC # BLD: 0 K/UL (ref 0–0.01)
NRBC BLD-RTO: 0 PER 100 WBC
PH UR STRIP: 6 [PH] (ref 4.6–8)
PLATELET # BLD AUTO: 201 K/UL (ref 150–400)
POTASSIUM SERPL-SCNC: 3.9 MMOL/L (ref 3.5–5.1)
PROT SERPL-MCNC: 6.7 G/DL (ref 6.4–8.2)
PROT UR QL STRIP: NORMAL
RBC # BLD AUTO: 4.59 M/UL (ref 3.8–5.2)
SODIUM SERPL-SCNC: 137 MMOL/L (ref 136–145)
SP GR UR STRIP: 1.03 (ref 1–1.03)
TIBC SERPL-MCNC: 386 UG/DL (ref 250–450)
UA UROBILINOGEN AMB POC: NORMAL (ref 0.2–1)
URINALYSIS CLARITY POC: NORMAL
URINALYSIS COLOR POC: YELLOW
URINE BLOOD POC: NEGATIVE
URINE LEUKOCYTES POC: NORMAL
URINE NITRITES POC: POSITIVE
WBC # BLD AUTO: 11.5 K/UL (ref 3.6–11)

## 2019-11-25 NOTE — PROGRESS NOTES
Return OB Visit   Didn't bring her log in but reports all fastings are normal, most PP also normal, max 139 but states those high values are rare. Objective:   BP 93/52 (BP 1 Location: Left arm, BP Patient Position: Sitting)   Pulse 83   Temp 98.1 °F (36.7 °C) (Oral)   Resp 16   Ht 5' 1\" (1.549 m)   Wt 215 lb (97.5 kg)   LMP 2019   SpO2 98%   BMI 40.62 kg/m²     See flowsheet   Assessment       ICD-10-CM ICD-9-CM    1. Supervision of high risk pregnancy, antepartum O09.90 V23.9 AMB POC URINALYSIS DIP STICK AUTO W/O MICRO      CULTURE, URINE      CBC W/O DIFF   2. Itching L29.9 698.9 BILE ACIDS, TOTAL      METABOLIC PANEL, COMPREHENSIVE   3. Iron deficiency anemia during pregnancy O99.019 648.20     D50.9 280.9    4. Glucose tolerance test abnormal R73.09 790.22      Plan   33yo  @ 29w0d  1. IUP: RH pos, s/p flu and tdap  2. LAY: followed by H/O, receiving iron infusions, last 10/25, on iron BID+bowel regimen. POC hbg 11.3.    3.  UTI: still has not cleared, treated again, getting BRENDAN  4. AMA: ASA  5. Morbid obesity: serial growth scans   6. Hx GDM: 1' GTT close to 200 and 3' very borderline. Keeping a log, reports most values at goal.  Only 4# of weight gain and FH same x4 weeks. Suspect it's all because she is now on a low carb diet and being very careful about what she eats. Requesting growth scan.     7.  Itching: checking BA and LFT    Orders Placed This Encounter    CULTURE, URINE     Standing Status:   Future     Standing Expiration Date:   2020    BILE ACIDS, TOTAL     Standing Status:   Future     Standing Expiration Date:       METABOLIC PANEL, COMPREHENSIVE     Standing Status:   Future     Standing Expiration Date:   2020    CBC W/O DIFF     Standing Status:   Future     Standing Expiration Date:   2020    AMB POC URINALYSIS DIP STICK AUTO W/O MICRO         Cyndi Gomez DO

## 2019-11-25 NOTE — PROGRESS NOTES
Chief Complaint   Patient presents with    Routine Prenatal Visit     29w0d    Leakage of Fluid: NO  Vaginal Bleeding: NO  Fetal Movement: YES  Prenatal vitamins: YES  Having Contractions: NO  Pain: NO    Visit Vitals  BP 93/52 (BP 1 Location: Left arm, BP Patient Position: Sitting)   Pulse 83   Temp 98.1 °F (36.7 °C) (Oral)   Resp 16   Ht 5' 1\" (1.549 m)   Wt 215 lb (97.5 kg)   LMP 05/06/2019   SpO2 98%   BMI 40.62 kg/m²     Immunization History   Administered Date(s) Administered    Influenza Vaccine (Quad) PF 10/11/2019    Tdap 11/18/2019

## 2019-11-27 DIAGNOSIS — O23.40 URINARY TRACT INFECTION IN MOTHER DURING PREGNANCY, ANTEPARTUM: ICD-10-CM

## 2019-11-27 DIAGNOSIS — O09.90 SUPERVISION OF HIGH RISK PREGNANCY, ANTEPARTUM: Primary | ICD-10-CM

## 2019-11-27 LAB
BACTERIA SPEC CULT: ABNORMAL
BILE AC SERPL-SCNC: 9.5 UMOL/L (ref 0–10)
CC UR VC: ABNORMAL
SERVICE CMNT-IMP: ABNORMAL

## 2019-11-27 RX ORDER — CEPHALEXIN 500 MG/1
500 CAPSULE ORAL 4 TIMES DAILY
Qty: 40 CAP | Refills: 0 | Status: CANCELLED | OUTPATIENT
Start: 2019-11-27 | End: 2019-12-07

## 2019-11-27 RX ORDER — SULFAMETHOXAZOLE AND TRIMETHOPRIM 800; 160 MG/1; MG/1
1 TABLET ORAL 2 TIMES DAILY
Qty: 14 TAB | Refills: 0 | Status: SHIPPED | OUTPATIENT
Start: 2019-11-27 | End: 2019-12-04

## 2019-11-27 NOTE — PROGRESS NOTES
Persistent high colony count ecoli UTI that I cannot get to clear. Have tried Keflex, followed by macrobid followed by longer duration of Keflex. Also costs of abx a problem.   Will try Bactrim x7 days (pt not in first tri or at term currently.)

## 2019-12-06 ENCOUNTER — OFFICE VISIT (OUTPATIENT)
Dept: ONCOLOGY | Age: 34
End: 2019-12-06

## 2019-12-06 VITALS
WEIGHT: 215 LBS | RESPIRATION RATE: 16 BRPM | HEIGHT: 61 IN | DIASTOLIC BLOOD PRESSURE: 63 MMHG | SYSTOLIC BLOOD PRESSURE: 100 MMHG | BODY MASS INDEX: 40.59 KG/M2 | TEMPERATURE: 97.9 F | HEART RATE: 78 BPM | OXYGEN SATURATION: 97 %

## 2019-12-06 DIAGNOSIS — D50.9 IRON DEFICIENCY ANEMIA DURING PREGNANCY: Primary | ICD-10-CM

## 2019-12-06 DIAGNOSIS — D64.9 FATIGUE ASSOCIATED WITH ANEMIA: ICD-10-CM

## 2019-12-06 DIAGNOSIS — O99.019 IRON DEFICIENCY ANEMIA DURING PREGNANCY: Primary | ICD-10-CM

## 2019-12-06 DIAGNOSIS — Z3A.30 30 WEEKS GESTATION OF PREGNANCY: ICD-10-CM

## 2019-12-06 RX ORDER — SODIUM CHLORIDE 0.9 % (FLUSH) 0.9 %
10 SYRINGE (ML) INJECTION AS NEEDED
Status: CANCELLED
Start: 2019-12-13

## 2019-12-06 RX ORDER — HEPARIN 100 UNIT/ML
300-500 SYRINGE INTRAVENOUS AS NEEDED
Status: CANCELLED
Start: 2019-12-13

## 2019-12-06 RX ORDER — EPINEPHRINE 1 MG/ML
0.3 INJECTION, SOLUTION, CONCENTRATE INTRAVENOUS AS NEEDED
Status: CANCELLED | OUTPATIENT
Start: 2019-12-13

## 2019-12-06 RX ORDER — ONDANSETRON 2 MG/ML
8 INJECTION INTRAMUSCULAR; INTRAVENOUS AS NEEDED
Status: CANCELLED | OUTPATIENT
Start: 2019-12-13

## 2019-12-06 RX ORDER — ALBUTEROL SULFATE 0.83 MG/ML
2.5 SOLUTION RESPIRATORY (INHALATION) AS NEEDED
Status: CANCELLED
Start: 2019-12-13

## 2019-12-06 RX ORDER — ACETAMINOPHEN 325 MG/1
650 TABLET ORAL AS NEEDED
Status: CANCELLED
Start: 2019-12-13

## 2019-12-06 RX ORDER — SODIUM CHLORIDE 9 MG/ML
25 INJECTION, SOLUTION INTRAVENOUS CONTINUOUS
Status: CANCELLED
Start: 2019-12-13

## 2019-12-06 RX ORDER — SODIUM CHLORIDE 9 MG/ML
10 INJECTION INTRAMUSCULAR; INTRAVENOUS; SUBCUTANEOUS AS NEEDED
Status: CANCELLED | OUTPATIENT
Start: 2019-12-13

## 2019-12-06 RX ORDER — DIPHENHYDRAMINE HYDROCHLORIDE 50 MG/ML
50 INJECTION, SOLUTION INTRAMUSCULAR; INTRAVENOUS AS NEEDED
Status: CANCELLED
Start: 2019-12-13

## 2019-12-06 RX ORDER — HYDROCORTISONE SODIUM SUCCINATE 100 MG/2ML
100 INJECTION, POWDER, FOR SOLUTION INTRAMUSCULAR; INTRAVENOUS AS NEEDED
Status: CANCELLED | OUTPATIENT
Start: 2019-12-13

## 2019-12-06 NOTE — PROGRESS NOTES
[id number] [facility] at [facility]  [phone]    Hematology / Oncology Consult    Reason for Visit:   Jasmine Jett is a 27 y.o. female who is seen in consultation at the request of Dr. Alex Gates for evaluation of anemia. History of Present Illness:   Jasmine Jett is a 27 y.o. female who comes in for evaluation of anemia. She is Yi speaking and was interviewed with assistance of a video . Patient is pregnant. She denies melena/hematochezia/hematuria. She denies heavy periods prior to pregnancy. She has had 3 prior pregnancies and did not have severe anemia at that time. No CP, but does endorse SOB with exertion. She endorses ice cravings, which started approx earlier this year. She states she is taking prenatal vitamins, but not taking iron supplements. She is also taking ASA and Vit C. Interval History: Today, patient is here for follow up of anemia. Patient is Albanian speaking. Used video . She had injectafer x 2 early October 2019. Her labs show mild anemia. She is taking oral iron supplements twice daily. Denies issues with constipation. Reports ongoing fatigue. She no longer has ice cravings. Reports she is sleeping much better at night. She is due February. No past medical history on file. No past surgical history on file. Social History     Tobacco Use    Smoking status: Never Smoker    Smokeless tobacco: Never Used   Substance Use Topics    Alcohol use: Not Currently      No family history on file. Current Outpatient Medications   Medication Sig    miconazole (MICOTIN) 2 % vaginal cream Insert 1 Applicator into vagina nightly.  ascorbic acid, vitamin C, (VITAMIN C) 500 mg tablet Take  by mouth daily.  ferrous sulfate 325 mg (65 mg iron) tablet Take 1 Tab by mouth two (2) times daily (after meals).  senna-docusate (PERICOLACE) 8.6-50 mg per tablet Take 1 Tab by mouth daily.     doxylamine succinate (UNISOM) 25 mg tablet Take 1 Tab by mouth nightly as needed for Sleep.  pyridoxine, vitamin B6, (VITAMIN B-6) 25 mg tablet Take 1 Tab by mouth daily.  prenatal vit-calcium-iron-fa (PRENATAL PLUS WITH CALCIUM) 27 mg iron- 1 mg tab Take 1 Tab by mouth daily.  aspirin 81 mg chewable tablet Take 1 Tab by mouth daily. No current facility-administered medications for this visit. No Known Allergies     Review of Systems: A complete review of systems was obtained, negative except as described above. Physical Exam:     Visit Vitals  /63   Pulse 78   Temp 97.9 °F (36.6 °C) (Oral)   Resp 16   Ht 5' 1\" (1.549 m)   Wt 215 lb (97.5 kg)   LMP 05/06/2019   SpO2 97%   BMI 40.62 kg/m²     ECOG PS: 0  General: Well developed, no acute distress  Eyes: PERRLA, EOMI, anicteric sclerae  HENT: Atraumatic, OP clear, TMs intact without erythema  Neck: Supple  Lymphatic: No cervical, supraclavicular, axillary or inguinal adenopathy  Respiratory: CTAB, normal respiratory effort  CV: Normal rate, regular rhythm, no murmurs, no peripheral edema  GI: Gravid abdomen, Soft, nontender, nondistended, no masses, no hepatomegaly, no splenomegaly  MS: Normal gait and station. Digits without clubbing or cyanosis. Skin: No rashes, ecchymoses, or petechiae. Normal temperature, turgor, and texture. Neuro/Psych: Alert, oriented. 5/5 strength in all 4 extremities. Appropriate affect, normal judgment/insight. Results:     Lab Results   Component Value Date/Time    WBC 11.5 (H) 11/25/2019 11:48 AM    HGB 11.5 11/25/2019 11:48 AM    HCT 38.0 11/25/2019 11:48 AM    PLATELET 350 16/79/6641 11:48 AM    MCV 82.8 11/25/2019 11:48 AM    ABS.  NEUTROPHILS 10.0 (H) 09/12/2019 11:22 AM    Hemoglobin (POC) 11.3 11/11/2019 02:15 PM     Lab Results   Component Value Date/Time    Sodium 137 11/25/2019 11:48 AM    Potassium 3.9 11/25/2019 11:48 AM    Chloride 108 11/25/2019 11:48 AM    CO2 23 11/25/2019 11:48 AM    Glucose 79 11/25/2019 11:48 AM    BUN 8 11/25/2019 11:48 AM    Creatinine 0.56 11/25/2019 11:48 AM    GFR est AA >60 11/25/2019 11:48 AM    GFR est non-AA >60 11/25/2019 11:48 AM    Calcium 8.6 11/25/2019 11:48 AM    Glucose  11/11/2019 03:43 PM     Lab Results   Component Value Date/Time    Bilirubin, total 0.3 11/25/2019 11:48 AM    ALT (SGPT) 40 11/25/2019 11:48 AM    AST (SGOT) 16 11/25/2019 11:48 AM    Alk. phosphatase 132 (H) 11/25/2019 11:48 AM    Protein, total 6.7 11/25/2019 11:48 AM    Albumin 3.1 (L) 11/25/2019 11:48 AM    Globulin 3.6 11/25/2019 11:48 AM     Lab Results   Component Value Date/Time    Iron 69 11/25/2019 11:48 AM    TIBC 386 11/25/2019 11:48 AM    Iron % saturation 18 (L) 11/25/2019 11:48 AM    Ferritin 113 11/25/2019 11:48 AM       No results found for: B12LT, FOL, RBCF  No results found for: TSH, TSH2, TSH3, TSHP, TSHEXT, TSHEXT  Lab Results   Component Value Date/Time    Hep B surface Ag screen Negative 09/12/2019 11:22 AM         Imaging:     Radiology report(s) reviewed. Assessment & Plan:   Travis Hinton is a 27 y.o. female who is pregnancy comes in for management of iron deficiency anemia. 1. Iron deficiency anemia: Now mild, but severe in the past during pregnancy. Improved after Injectafer x 2 in 10/2019. Labs on 11/25/19 show mild anemia with a hgb 11.5, ferritin 118, iron sat 18%. -- Ferrous sulfate BID.    -- Injectafer x 1 ordered due to persistent fatigue. -- Return in 12 weeks for repeat labs, MD visit. 2. Pregnancy: Approx 30 weeks gestation. Followed by Dakota Hdz. I appreciate the opportunity to participate in Ms. Amanda Raymond's care.     Signed By: Jace Nicole MD     December 6, 2019

## 2019-12-06 NOTE — PROGRESS NOTES
Orville Mars is a 27 y.o. female here today for follow up of anemia. [employer]  via video used for office visit today.

## 2019-12-09 ENCOUNTER — ROUTINE PRENATAL (OUTPATIENT)
Dept: FAMILY MEDICINE CLINIC | Age: 34
End: 2019-12-09

## 2019-12-09 ENCOUNTER — HOSPITAL ENCOUNTER (OUTPATIENT)
Dept: LAB | Age: 34
Discharge: HOME | End: 2019-12-09

## 2019-12-09 VITALS
OXYGEN SATURATION: 98 % | BODY MASS INDEX: 40.78 KG/M2 | HEART RATE: 73 BPM | TEMPERATURE: 98 F | HEIGHT: 61 IN | SYSTOLIC BLOOD PRESSURE: 91 MMHG | WEIGHT: 216 LBS | DIASTOLIC BLOOD PRESSURE: 58 MMHG | RESPIRATION RATE: 16 BRPM

## 2019-12-09 DIAGNOSIS — D50.9 IRON DEFICIENCY ANEMIA DURING PREGNANCY: ICD-10-CM

## 2019-12-09 DIAGNOSIS — O99.019 IRON DEFICIENCY ANEMIA DURING PREGNANCY: ICD-10-CM

## 2019-12-09 DIAGNOSIS — O09.90 SUPERVISION OF HIGH RISK PREGNANCY, ANTEPARTUM: ICD-10-CM

## 2019-12-09 DIAGNOSIS — O09.90 SUPERVISION OF HIGH RISK PREGNANCY, ANTEPARTUM: Primary | ICD-10-CM

## 2019-12-09 LAB
BILIRUB UR QL STRIP: NEGATIVE
FERRITIN SERPL-MCNC: 85 NG/ML (ref 26–388)
GLUCOSE UR-MCNC: NEGATIVE MG/DL
IRON SATN MFR SERPL: 15 % (ref 20–50)
IRON SERPL-MCNC: 62 UG/DL (ref 35–150)
KETONES P FAST UR STRIP-MCNC: NEGATIVE MG/DL
PH UR STRIP: 6 [PH] (ref 4.6–8)
PROT UR QL STRIP: NORMAL
SP GR UR STRIP: 1.03 (ref 1–1.03)
TIBC SERPL-MCNC: 408 UG/DL (ref 250–450)
UA UROBILINOGEN AMB POC: NORMAL (ref 0.2–1)
URINALYSIS CLARITY POC: NORMAL
URINALYSIS COLOR POC: NORMAL
URINE BLOOD POC: NORMAL
URINE LEUKOCYTES POC: NORMAL
URINE NITRITES POC: NEGATIVE

## 2019-12-09 RX ORDER — ACYCLOVIR 400 MG/1
400 TABLET ORAL 2 TIMES DAILY
Qty: 14 TAB | Refills: 0 | Status: SHIPPED | OUTPATIENT
Start: 2019-12-09 | End: 2019-12-16 | Stop reason: SDUPTHER

## 2019-12-09 RX ORDER — MICONAZOLE NITRATE 2 %
1 CREAM WITH APPLICATOR VAGINAL
Qty: 45 G | Refills: 0 | Status: ON HOLD | OUTPATIENT
Start: 2019-12-09 | End: 2020-01-20

## 2019-12-09 NOTE — PROGRESS NOTES
Chief Complaint   Patient presents with    Routine Prenatal Visit     31w0d    Leakage of Fluid: White discharge that itches  Vaginal Bleeding: NO  Fetal Movement: YES  Prenatal vitamins: YES  Having Contractions: NO  Pain: NO    Visit Vitals  Resp 16   Ht 5' 1\" (1.549 m)   Wt 216 lb (98 kg)   LMP 05/06/2019   BMI 40.81 kg/m²

## 2019-12-09 NOTE — PROGRESS NOTES
Return OB Visit   Vaginal discharge that itches. Log shows most values at goal, some PP dinner high in 120s (about 50%). Objective:   BP 91/58 (BP 1 Location: Left arm, BP Patient Position: Sitting)   Pulse 73   Temp 98 °F (36.7 °C) (Oral)   Resp 16   Ht 5' 1\" (1.549 m)   Wt 216 lb (98 kg)   LMP 2019   SpO2 98%   BMI 40.81 kg/m²     See flowsheet     : externally there is an area of tiny small ulcerations at L upper labia majoria, there is some swelling and questionable induration under the skin. On speculum exam there is a thick white/green discharge in vault, cervix visually closed. Wet mount: +yeast, +WBC, no clue or trich seen     Assessment       ICD-10-CM ICD-9-CM    1. Supervision of high risk pregnancy, antepartum O09.90 V23.9 AMB POC URINALYSIS DIP STICK AUTO W/O MICRO      CULTURE, URINE      CULTURE, HSV W/ TYPING      miconazole (MICOTIN) 2 % vaginal cream      HSV 1/2 AB, IGG/IGM      acyclovir (ZOVIRAX) 400 mg tablet     Plan   35yo  @ 31w0d  1. IUP: RH pos, s/p flu and tdap  2. LAY: followed by H/O, receiving iron infusions, last 10/25, on iron BID+bowel regimen. Hbg now normal.  3.  UTI: have had trouble clearing despite several rounds of abx so most recently tried Bactrim. Getting BRENDAN. 4.  AMA: ASA  5. Morbid obesity: next growth scan  at 9:30am  6. Hx GDM: 1' GTT close to 200 and 3' very borderline. Keeping a log, majority of values at goal - counseled regarding some elevated PP dinner in 120s. Only 5# of weight gain and FH same x4 weeks. Suspect it's all because she is now on a low carb diet and being very careful about what she eats. 7.  Yeast vaginitis: treating with miconazole  8. Vaginal ulceration: some concern for HSV so have cultured, drawn serology and will treat out of concern for primary outbreak. Also with questionable induration underneath so somewhat worried this is an abscess formation?   Would be odd though as just finished a course of Bactrim for UTI. Will see back in 1 week to reevaluate. Orders Placed This Encounter    CULTURE, URINE     Standing Status:   Future     Standing Expiration Date:   12/9/2020    CULTURE, HSV W/ TYPING     Standing Status:   Future     Standing Expiration Date:   12/9/2020     Order Specific Question:   Specimen source     Answer:   Vaginal [[id number]]    HSV 1/2 AB, IGG/IGM     Standing Status:   Future     Standing Expiration Date:   12/9/2020    AMB POC URINALYSIS DIP STICK AUTO W/O MICRO    miconazole (MICOTIN) 2 % vaginal cream     Sig: Insert 1 Applicator into vagina nightly. Dispense:  45 g     Refill:  0    acyclovir (ZOVIRAX) 400 mg tablet     Sig: Take 1 Tab by mouth two (2) times a day for 7 days.      Dispense:  14 Tab     Refill:  0         Cyndi Gomez,

## 2019-12-11 LAB
BACTERIA SPEC CULT: NORMAL
CC UR VC: NORMAL
HSV SPEC CULT: ABNORMAL
HSV-2 IGG SUPPLEMENTAL TEST: POSITIVE
HSV1 IGG SER IA-ACNC: 24.2 INDEX (ref 0–0.9)
HSV1+2 IGM SER IA-ACNC: <0.91 RATIO (ref 0–0.9)
HSV2 IGG SER IA-ACNC: 3.93 INDEX (ref 0–0.9)
SERVICE CMNT-IMP: NORMAL
SPECIMEN SOURCE: ABNORMAL

## 2019-12-12 NOTE — PROGRESS NOTES
Treated for HSV out of clinical concern and HSV cx returned pos. Serology suggests this isn't a primary outbreak, though could be early and IgM hasn't converted? Pt has never had HSV that she is aware of so safest thing would be to treat as if this is primary. Will plan to put on ppx 35-36wk.

## 2019-12-13 ENCOUNTER — HOSPITAL ENCOUNTER (OUTPATIENT)
Dept: INFUSION THERAPY | Age: 34
Discharge: HOME | End: 2019-12-13
Payer: SUBSIDIZED

## 2019-12-13 VITALS
DIASTOLIC BLOOD PRESSURE: 57 MMHG | HEART RATE: 81 BPM | WEIGHT: 217.5 LBS | RESPIRATION RATE: 16 BRPM | HEIGHT: 61 IN | OXYGEN SATURATION: 97 % | BODY MASS INDEX: 41.07 KG/M2 | TEMPERATURE: 97.9 F | SYSTOLIC BLOOD PRESSURE: 104 MMHG

## 2019-12-13 DIAGNOSIS — D50.0 IRON DEFICIENCY ANEMIA DUE TO CHRONIC BLOOD LOSS: Primary | ICD-10-CM

## 2019-12-13 PROCEDURE — 96365 THER/PROPH/DIAG IV INF INIT: CPT

## 2019-12-13 PROCEDURE — 74011250636 HC RX REV CODE- 250/636: Performed by: NURSE PRACTITIONER

## 2019-12-13 RX ORDER — SODIUM CHLORIDE 0.9 % (FLUSH) 0.9 %
10 SYRINGE (ML) INJECTION AS NEEDED
Status: ACTIVE | OUTPATIENT
Start: 2019-12-13 | End: 2019-12-13

## 2019-12-13 RX ORDER — SODIUM CHLORIDE 9 MG/ML
10 INJECTION INTRAMUSCULAR; INTRAVENOUS; SUBCUTANEOUS AS NEEDED
Status: ACTIVE | OUTPATIENT
Start: 2019-12-13 | End: 2019-12-13

## 2019-12-13 RX ORDER — SODIUM CHLORIDE 9 MG/ML
25 INJECTION, SOLUTION INTRAVENOUS CONTINUOUS
Status: DISPENSED | OUTPATIENT
Start: 2019-12-13 | End: 2019-12-13

## 2019-12-13 RX ORDER — HEPARIN 100 UNIT/ML
300-500 SYRINGE INTRAVENOUS AS NEEDED
Status: ACTIVE | OUTPATIENT
Start: 2019-12-13 | End: 2019-12-13

## 2019-12-13 RX ADMIN — FERRIC CARBOXYMALTOSE INJECTION 750 MG: 50 INJECTION, SOLUTION INTRAVENOUS at 10:31

## 2019-12-13 RX ADMIN — SODIUM CHLORIDE 25 ML/HR: 900 INJECTION, SOLUTION INTRAVENOUS at 10:04

## 2019-12-13 RX ADMIN — Medication 10 ML: at 10:04

## 2019-12-13 NOTE — PROGRESS NOTES
OUTPATIENT INFUSION CENTER    DISCHARGE INSTRUCTIONS FOR:    IRON INFUSIONS - INCLUDING VENOFER, FERRLECIT, AND INFED    You should continue to take your usual home medications unless otherwise instructed by your physician. Drink plenty of fluids and eat your usual diet. All medications have the potential to cause side effects. Your physician can instruct you regarding any necessary treatment for side effects. Some possible side effects of iron infusions may include the following:     - Urinary changes;   - Mild muscle cramping;   - Mild nausea, stomach pain, diarrhea or constipation;   - Mild skin itching, mild pain at IV site. Signs/Symptoms of an allergic reaction may require immediate medical attention. These may include one or more of the following:       Skin redness, itching, swelling, blistering, weeping, crusting, rash or hives. Wheezing, chest tightness, cough, or shortness of breath;   Swelling of the face, eyelids, lips, tongue, or throat;  Severe headache, seizures or tremor;  Stuffy nose, runny nose, sneezing;   Red (bloodshot), itchy, swollen, or watery eyes;  Stomach  pain, nausea, vomiting, diarrhea or bloody diarrhea; Chest pain or tightness, increased heart rate, palpitations, changes in blood pressure which can cause dizziness, unusual feelings of weakness or fatigue;  Back ache or pain around waist;  Painful urination, increase or decrease in amount of urine, blood in urine. Contact your physicians office with any questions or concerns regarding your treatment.     Abdulkadir Saucedo, Signature: _____________________________________ 12/13/2019  Go Delgado RN

## 2019-12-13 NOTE — PROGRESS NOTES
Outpatient Infusion Center Progress Note     Pt admit to [facility] for Injectafer infusion. ambulatory in stable condition. Assessment completed via telephone . No new concerns voiced. 24g PIV inserted into L forearm without difficulty. Visit Vitals  BP 94/50   Pulse 75   Temp 98.3 °F (36.8 °C)   Resp 16   Ht 5' 1\" (1.549 m)   Wt 98.7 kg (217 lb 8 oz)   LMP 05/06/2019   SpO2 98%   Breastfeeding No   BMI 41.10 kg/m²       Medications:  Medications Administered     0.9% sodium chloride infusion     Admin Date  12/13/2019 Action  New Bag Dose  25 mL/hr Rate  25 mL/hr Route  IntraVENous Administered By  Jason Feliciano RN          ferric carboxymaltose (INJECTAFER) 750 mg in 0.9% sodium chloride 250 mL IVPB     Admin Date  12/13/2019 Action  Given Dose  750 mg Rate  750 mL/hr Route  IntraVENous Administered By  Jason Feliciano RN          saline peripheral flush soln 10 mL     Admin Date  12/13/2019 Action  Given Dose  10 mL Route  InterCATHeter Administered By  Jason Feliciano RN                 Pt tolerated treatment well. D/c home ambulatory in no distress. D/C instructions reviewed with telephone . Pt aware of next appointment scheduled.

## 2019-12-16 ENCOUNTER — ROUTINE PRENATAL (OUTPATIENT)
Dept: FAMILY MEDICINE CLINIC | Age: 34
End: 2019-12-16

## 2019-12-16 VITALS
HEIGHT: 61 IN | TEMPERATURE: 98.4 F | DIASTOLIC BLOOD PRESSURE: 57 MMHG | RESPIRATION RATE: 14 BRPM | OXYGEN SATURATION: 98 % | WEIGHT: 219 LBS | BODY MASS INDEX: 41.35 KG/M2 | SYSTOLIC BLOOD PRESSURE: 102 MMHG | HEART RATE: 93 BPM

## 2019-12-16 DIAGNOSIS — O09.90 SUPERVISION OF HIGH RISK PREGNANCY, ANTEPARTUM: ICD-10-CM

## 2019-12-16 RX ORDER — ACYCLOVIR 400 MG/1
400 TABLET ORAL 2 TIMES DAILY
Qty: 6 TAB | Refills: 0 | Status: SHIPPED | OUTPATIENT
Start: 2019-12-16 | End: 2019-12-19

## 2019-12-16 NOTE — PROGRESS NOTES
Return OB Visit   Comes back for reevaluation of vaginal symptoms. Reports things are better. Still has a few bumps on the outside and wants a few more days of medication to get rid of it, but overall better. Objective:   /57 (BP 1 Location: Left arm, BP Patient Position: Sitting)   Pulse 93   Temp 98.4 °F (36.9 °C) (Oral)   Resp 14   Ht 5' 1\" (1.549 m)   Wt 219 lb (99.3 kg)   LMP 2019   SpO2 98%   BMI 41.38 kg/m²     See flowsheet     Assessment       ICD-10-CM ICD-9-CM    1. Supervision of high risk pregnancy, antepartum O09.90 V23.9 acyclovir (ZOVIRAX) 400 mg tablet     Plan   33yo  @ 32w0d  1. IUP: RH pos, s/p flu and tdap  2. LAY: followed by H/O, receiving iron infusions, last 10/25, on iron BID+bowel regimen. Hbg now normal.  3.  UTI: have had trouble clearing despite several rounds of abx so most recently tried Bactrim. Getting BRENDAN. 4.  AMA: ASA  5. Morbid obesity: next growth scan  at 9:30am  6. Hx GDM: 1' GTT close to 200 and 3' very borderline. Keeping a log, but didn't bring it today. 7.  HSV: cx came back pos at last appt, treated for primary HSV outbreak as pt without a hx she said. Extending treatment for a total of 10 days. Will start suppression at [address] [facility]. Orders Placed This Encounter    acyclovir (ZOVIRAX) 400 mg tablet     Sig: Take 1 Tab by mouth two (2) times a day for 3 days.      Dispense:  6 Tab     Refill:  0         Cyndi Gomez, DO

## 2020-01-02 ENCOUNTER — HOSPITAL ENCOUNTER (OUTPATIENT)
Dept: PERINATAL CARE | Age: 35
Discharge: HOME | End: 2020-01-02
Attending: OBSTETRICS & GYNECOLOGY
Payer: SUBSIDIZED

## 2020-01-02 PROCEDURE — 76819 FETAL BIOPHYS PROFIL W/O NST: CPT | Performed by: OBSTETRICS & GYNECOLOGY

## 2020-01-02 PROCEDURE — 76816 OB US FOLLOW-UP PER FETUS: CPT | Performed by: OBSTETRICS & GYNECOLOGY

## 2020-01-03 ENCOUNTER — HOSPITAL ENCOUNTER (OUTPATIENT)
Dept: LAB | Age: 35
Discharge: HOME | End: 2020-01-03

## 2020-01-03 ENCOUNTER — ROUTINE PRENATAL (OUTPATIENT)
Dept: FAMILY MEDICINE CLINIC | Age: 35
End: 2020-01-03

## 2020-01-03 VITALS
HEIGHT: 61 IN | RESPIRATION RATE: 16 BRPM | TEMPERATURE: 98.2 F | HEART RATE: 87 BPM | OXYGEN SATURATION: 97 % | DIASTOLIC BLOOD PRESSURE: 60 MMHG | SYSTOLIC BLOOD PRESSURE: 99 MMHG | BODY MASS INDEX: 41.16 KG/M2 | WEIGHT: 218 LBS

## 2020-01-03 DIAGNOSIS — O99.019 IRON DEFICIENCY ANEMIA DURING PREGNANCY: ICD-10-CM

## 2020-01-03 DIAGNOSIS — D50.9 IRON DEFICIENCY ANEMIA DURING PREGNANCY: ICD-10-CM

## 2020-01-03 DIAGNOSIS — L29.9 ITCHING: ICD-10-CM

## 2020-01-03 DIAGNOSIS — O09.90 SUPERVISION OF HIGH RISK PREGNANCY, ANTEPARTUM: Primary | ICD-10-CM

## 2020-01-03 DIAGNOSIS — O09.90 SUPERVISION OF HIGH RISK PREGNANCY, ANTEPARTUM: ICD-10-CM

## 2020-01-03 LAB
ALBUMIN SERPL-MCNC: 3 G/DL (ref 3.5–5)
ALBUMIN/GLOB SERPL: 0.8 {RATIO} (ref 1.1–2.2)
ALP SERPL-CCNC: 205 U/L (ref 45–117)
ALT SERPL-CCNC: 54 U/L (ref 12–78)
ANION GAP SERPL CALC-SCNC: 8 MMOL/L (ref 5–15)
AST SERPL-CCNC: 23 U/L (ref 15–37)
BASOPHILS # BLD: 0 K/UL (ref 0–0.1)
BASOPHILS NFR BLD: 0 % (ref 0–1)
BILIRUB SERPL-MCNC: 0.3 MG/DL (ref 0.2–1)
BILIRUB UR QL STRIP: NEGATIVE
BUN SERPL-MCNC: 9 MG/DL (ref 6–20)
BUN/CREAT SERPL: 19 (ref 12–20)
CALCIUM SERPL-MCNC: 9.1 MG/DL (ref 8.5–10.1)
CHLORIDE SERPL-SCNC: 108 MMOL/L (ref 97–108)
CO2 SERPL-SCNC: 22 MMOL/L (ref 21–32)
CREAT SERPL-MCNC: 0.47 MG/DL (ref 0.55–1.02)
DIFFERENTIAL METHOD BLD: ABNORMAL
EOSINOPHIL # BLD: 0.9 K/UL (ref 0–0.4)
EOSINOPHIL NFR BLD: 7 % (ref 0–7)
ERYTHROCYTE [DISTWIDTH] IN BLOOD BY AUTOMATED COUNT: 19.9 % (ref 11.5–14.5)
GLOBULIN SER CALC-MCNC: 4 G/DL (ref 2–4)
GLUCOSE SERPL-MCNC: 106 MG/DL (ref 65–100)
GLUCOSE UR-MCNC: NEGATIVE MG/DL
HCT VFR BLD AUTO: 40.3 % (ref 35–47)
HGB BLD-MCNC: 13 G/DL (ref 11.5–16)
IMM GRANULOCYTES # BLD AUTO: 0.1 K/UL (ref 0–0.04)
IMM GRANULOCYTES NFR BLD AUTO: 1 % (ref 0–0.5)
KETONES P FAST UR STRIP-MCNC: NEGATIVE MG/DL
LYMPHOCYTES # BLD: 1.9 K/UL (ref 0.8–3.5)
LYMPHOCYTES NFR BLD: 15 % (ref 12–49)
MCH RBC QN AUTO: 28.1 PG (ref 26–34)
MCHC RBC AUTO-ENTMCNC: 32.3 G/DL (ref 30–36.5)
MCV RBC AUTO: 87.2 FL (ref 80–99)
MONOCYTES # BLD: 0.9 K/UL (ref 0–1)
MONOCYTES NFR BLD: 7 % (ref 5–13)
NEUTS SEG # BLD: 8.6 K/UL (ref 1.8–8)
NEUTS SEG NFR BLD: 70 % (ref 32–75)
NRBC # BLD: 0 K/UL (ref 0–0.01)
NRBC BLD-RTO: 0 PER 100 WBC
PH UR STRIP: 6.5 [PH] (ref 4.6–8)
PLATELET # BLD AUTO: 189 K/UL (ref 150–400)
PMV BLD AUTO: ABNORMAL FL (ref 8.9–12.9)
POTASSIUM SERPL-SCNC: 3.8 MMOL/L (ref 3.5–5.1)
PROT SERPL-MCNC: 7 G/DL (ref 6.4–8.2)
PROT UR QL STRIP: NORMAL
RBC # BLD AUTO: 4.62 M/UL (ref 3.8–5.2)
SODIUM SERPL-SCNC: 138 MMOL/L (ref 136–145)
SP GR UR STRIP: 1.02 (ref 1–1.03)
UA UROBILINOGEN AMB POC: NORMAL (ref 0.2–1)
URINALYSIS CLARITY POC: NORMAL
URINALYSIS COLOR POC: YELLOW
URINE BLOOD POC: NEGATIVE
URINE LEUKOCYTES POC: NEGATIVE
URINE NITRITES POC: NEGATIVE
WBC # BLD AUTO: 12.4 K/UL (ref 3.6–11)

## 2020-01-03 NOTE — PROGRESS NOTES
Chief Complaint   Patient presents with    Routine Prenatal Visit     34w4d    Leakage of Fluid: NO  Vaginal Bleeding: NO  Fetal Movement: YES  Prenatal vitamins: YES  Having Contractions: NO  Pain: NO    Visit Vitals  BP 99/60 (BP 1 Location: Left arm, BP Patient Position: Sitting)   Pulse 87   Temp 98.2 °F (36.8 °C) (Oral)   Resp 16   Ht 5' 1\" (1.549 m)   Wt 218 lb (98.9 kg)   LMP 05/06/2019   SpO2 97%   BMI 41.19 kg/m²     Immunization History   Administered Date(s) Administered    Influenza Vaccine (Quad) PF 10/11/2019    Tdap 11/18/2019

## 2020-01-03 NOTE — PROGRESS NOTES
Return OB Visit   Itching on palms and soles     Objective:   BP 99/60 (BP 1 Location: Left arm, BP Patient Position: Sitting)   Pulse 87   Temp 98.2 °F (36.8 °C) (Oral)   Resp 16   Ht 5' 1\" (1.549 m)   Wt 218 lb (98.9 kg)   LMP 2019   SpO2 97%   BMI 41.19 kg/m²     See flowsheet     Assessment       ICD-10-CM ICD-9-CM    1. Supervision of high risk pregnancy, antepartum O09.90 V23.9 AMB POC URINALYSIS DIP STICK AUTO W/ MICRO     Plan   35yo  @ 34w4d  1. IUP: RH pos, s/p flu and tdap  2. LAY: followed by H/O, receiving iron infusions, last 10/25, on iron BID+bowel regimen. Hbg now normal.  3.  UTI: have had trouble clearing despite several rounds of abx so most recently tried Bactrim. Getting BRENDAN. 4.  AMA: ASA  5. Polyhydramnios: MYRA 29, weekly testing with MFM  6. Hx GDM: 1' GTT close to 200 and 3' very borderline. Keeping a log, PP are borderline, will continue to have her check. 7.  HSV: dx this pregnancy, start ppx at 36 wk \  8. Morbid obesity: EFW 40th% on   9.   Itching: check bile acids and CMP, if needs ursodiol send to [facility] [place] [facility] This Encounter    AMB POC URINALYSIS DIP STICK AUTO W/ MICRO         Cyndi Gomez,

## 2020-01-06 LAB — BILE AC SERPL-SCNC: 22.8 UMOL/L (ref 0–10)

## 2020-01-08 ENCOUNTER — TELEPHONE (OUTPATIENT)
Dept: FAMILY MEDICINE CLINIC | Age: 35
End: 2020-01-08

## 2020-01-08 RX ORDER — URSODIOL 250 MG/1
250 TABLET, FILM COATED ORAL 2 TIMES DAILY
Qty: 24 TAB | Refills: 0 | Status: SHIPPED | OUTPATIENT
Start: 2020-01-08 | End: 2020-01-23

## 2020-01-08 NOTE — TELEPHONE ENCOUNTER
Called patient with [facility]  [phone]. Informed patient of bile acid level coming back high and patient has cholestasis of pregnancy. Advised her ursodiol 250mg sent to her pharmacy and she can also take benadryl for the itching. Informed her of induction of labor scheduled for 01/20/20 at [address] and Delivery. Patient verbalized understanding. Will notify [facility] of new dx of cholestasis as she has appointment scheduled for tomorrow.

## 2020-01-08 NOTE — PROGRESS NOTES
Starting ursodiol 250mg bid for ICP (250mg tabs significantly more affordable than 300mg and pt without insurance.)  Has MFM f/up already for BPP so will inform them of this dx. IOL scheduled for 1/20 4pm for jones bulb assuming she won't be favorable at 37 wk (will check in clinic).

## 2020-01-09 ENCOUNTER — HOSPITAL ENCOUNTER (OUTPATIENT)
Dept: PERINATAL CARE | Age: 35
Discharge: HOME | End: 2020-01-09
Attending: OBSTETRICS & GYNECOLOGY
Payer: SUBSIDIZED

## 2020-01-09 PROCEDURE — 76819 FETAL BIOPHYS PROFIL W/O NST: CPT | Performed by: OBSTETRICS & GYNECOLOGY

## 2020-01-10 ENCOUNTER — ROUTINE PRENATAL (OUTPATIENT)
Dept: FAMILY MEDICINE CLINIC | Age: 35
End: 2020-01-10

## 2020-01-10 ENCOUNTER — HOSPITAL ENCOUNTER (OUTPATIENT)
Dept: LAB | Age: 35
Discharge: HOME | End: 2020-01-10

## 2020-01-10 VITALS
HEIGHT: 61 IN | BODY MASS INDEX: 41.35 KG/M2 | WEIGHT: 219 LBS | RESPIRATION RATE: 16 BRPM | TEMPERATURE: 98.1 F | OXYGEN SATURATION: 98 % | SYSTOLIC BLOOD PRESSURE: 100 MMHG | DIASTOLIC BLOOD PRESSURE: 59 MMHG | HEART RATE: 72 BPM

## 2020-01-10 DIAGNOSIS — O09.529 ANTEPARTUM MULTIGRAVIDA OF ADVANCED MATERNAL AGE: ICD-10-CM

## 2020-01-10 DIAGNOSIS — O09.90 SUPERVISION OF HIGH RISK PREGNANCY, ANTEPARTUM: ICD-10-CM

## 2020-01-10 DIAGNOSIS — K83.1 CHOLESTASIS DURING PREGNANCY, ANTEPARTUM: ICD-10-CM

## 2020-01-10 DIAGNOSIS — B00.9 HERPES VIRUS INFECTION IN MOTHER DURING PREGNANCY, ANTEPARTUM: ICD-10-CM

## 2020-01-10 DIAGNOSIS — O98.519 HERPES VIRUS INFECTION IN MOTHER DURING PREGNANCY, ANTEPARTUM: ICD-10-CM

## 2020-01-10 DIAGNOSIS — D50.9 IRON DEFICIENCY ANEMIA DURING PREGNANCY: ICD-10-CM

## 2020-01-10 DIAGNOSIS — O40.9XX0 POLYHYDRAMNIOS, ANTEPARTUM, SINGLE OR UNSPECIFIED FETUS: ICD-10-CM

## 2020-01-10 DIAGNOSIS — O26.619 CHOLESTASIS DURING PREGNANCY, ANTEPARTUM: ICD-10-CM

## 2020-01-10 DIAGNOSIS — O09.90 SUPERVISION OF HIGH RISK PREGNANCY, ANTEPARTUM: Primary | ICD-10-CM

## 2020-01-10 DIAGNOSIS — O99.019 IRON DEFICIENCY ANEMIA DURING PREGNANCY: ICD-10-CM

## 2020-01-10 LAB
BILIRUB UR QL STRIP: NEGATIVE
GLUCOSE UR-MCNC: NEGATIVE MG/DL
GRBS, EXTERNAL: POSITIVE
KETONES P FAST UR STRIP-MCNC: NEGATIVE MG/DL
PH UR STRIP: 5.5 [PH] (ref 4.6–8)
PROT UR QL STRIP: NEGATIVE
SP GR UR STRIP: 1.01 (ref 1–1.03)
UA UROBILINOGEN AMB POC: NORMAL (ref 0.2–1)
URINALYSIS CLARITY POC: NORMAL
URINALYSIS COLOR POC: YELLOW
URINE BLOOD POC: NORMAL
URINE LEUKOCYTES POC: NORMAL
URINE NITRITES POC: POSITIVE

## 2020-01-10 RX ORDER — ACYCLOVIR 400 MG/1
400 TABLET ORAL 3 TIMES DAILY
Qty: 21 TAB | Refills: 0 | Status: SHIPPED | OUTPATIENT
Start: 2020-01-10 | End: 2020-01-17

## 2020-01-10 NOTE — PROGRESS NOTES
Return OB Visit   Itching on palms and soles     Objective:   /59 (BP 1 Location: Left arm, BP Patient Position: Sitting)   Pulse 72   Temp 98.1 °F (36.7 °C) (Oral)   Resp 16   Ht 5' 1\" (1.549 m)   Wt 219 lb (99.3 kg)   LMP 2019   SpO2 98%   BMI 41.38 kg/m²     See flowsheet     Assessment       ICD-10-CM ICD-9-CM    1. Supervision of high risk pregnancy, antepartum O09.90 V23.9 AMB POC URINALYSIS DIP STICK AUTO W/O MICRO      CULTURE, GENITAL GROUP B STREP      acyclovir (ZOVIRAX) 400 mg tablet   2. Iron deficiency anemia during pregnancy O99.019 648.20     D50.9 280.9    3. Cholestasis during pregnancy, antepartum O26.619 646.73     K83.1 576.8    4. Antepartum multigravida of advanced maternal age O12.46 65.56    11. Polyhydramnios, antepartum, single or unspecified fetus O40. 9XX0 657.03    6. Herpes virus infection in mother during pregnancy, antepartum O98.519 647.63     B00.9 054.9      Plan   33yo  @ 35w4d  1. IUP: RH pos, s/p flu and tdap, GBS collected  2. LAY: followed by H/O, receiving iron infusions, last 10/25, on iron BID+bowel regimen. Hbg now normal.  3.  UTI: finally cleared   4. AMA: ASA  5. Polyhydramnios: MYRA 32, weekly testing with MFM  6. Hx GDM: 1' GTT close to 200 and 3' very borderline. Keeping a log, PP are borderline, will continue to have her check. 7.  HSV: dx this pregnancy, start ppx at 36 wk  8. Morbid obesity: EFW 40th% on   9. ICP: started ursodiol, scheduled for IOL at 37 wk  at 4pm for jones bulb, cervical check next visit      Orders Placed This Encounter    CULTURE, GENITAL GROUP B STREP     Standing Status:   Future     Standing Expiration Date:   7/10/2020    AMB POC URINALYSIS DIP STICK AUTO W/O MICRO    acyclovir (ZOVIRAX) 400 mg tablet     Sig: Take 1 Tab by mouth three (3) times daily for 7 days.      Dispense:  21 Tab     Refill:  0         Cyndi Gomez DO

## 2020-01-10 NOTE — PROGRESS NOTES
Chief Complaint   Patient presents with    Routine Prenatal Visit     35w4d    Leakage of Fluid: NO  Vaginal Bleeding: NO  Fetal Movement: YES  Prenatal vitamins: YES  Having Contractions: NO  Pain: NO    Visit Vitals  /59 (BP 1 Location: Left arm, BP Patient Position: Sitting)   Pulse 72   Temp 98.1 °F (36.7 °C) (Oral)   Resp 16   Ht 5' 1\" (1.549 m)   Wt 219 lb (99.3 kg)   LMP 05/06/2019   SpO2 98%   BMI 41.38 kg/m²

## 2020-01-12 LAB
BACTERIA SPEC CULT: ABNORMAL
BACTERIA SPEC CULT: ABNORMAL
CC UR VC: ABNORMAL
SERVICE CMNT-IMP: ABNORMAL
SERVICE CMNT-IMP: ABNORMAL

## 2020-01-17 ENCOUNTER — ROUTINE PRENATAL (OUTPATIENT)
Dept: FAMILY MEDICINE CLINIC | Age: 35
End: 2020-01-17

## 2020-01-17 ENCOUNTER — TELEPHONE (OUTPATIENT)
Dept: FAMILY MEDICINE CLINIC | Age: 35
End: 2020-01-17

## 2020-01-17 ENCOUNTER — HOSPITAL ENCOUNTER (OUTPATIENT)
Dept: PERINATAL CARE | Age: 35
Discharge: HOME | End: 2020-01-17
Attending: OBSTETRICS & GYNECOLOGY
Payer: SUBSIDIZED

## 2020-01-17 VITALS
OXYGEN SATURATION: 95 % | SYSTOLIC BLOOD PRESSURE: 98 MMHG | BODY MASS INDEX: 41.72 KG/M2 | WEIGHT: 221 LBS | HEART RATE: 88 BPM | TEMPERATURE: 98.2 F | HEIGHT: 61 IN | DIASTOLIC BLOOD PRESSURE: 62 MMHG | RESPIRATION RATE: 16 BRPM

## 2020-01-17 DIAGNOSIS — O40.9XX0 POLYHYDRAMNIOS, ANTEPARTUM, SINGLE OR UNSPECIFIED FETUS: ICD-10-CM

## 2020-01-17 DIAGNOSIS — O98.519 HERPES VIRUS INFECTION IN MOTHER DURING PREGNANCY, ANTEPARTUM: ICD-10-CM

## 2020-01-17 DIAGNOSIS — D50.9 IRON DEFICIENCY ANEMIA DURING PREGNANCY: ICD-10-CM

## 2020-01-17 DIAGNOSIS — O09.529 ANTEPARTUM MULTIGRAVIDA OF ADVANCED MATERNAL AGE: ICD-10-CM

## 2020-01-17 DIAGNOSIS — O09.90 SUPERVISION OF HIGH RISK PREGNANCY, ANTEPARTUM: Primary | ICD-10-CM

## 2020-01-17 DIAGNOSIS — O99.019 IRON DEFICIENCY ANEMIA DURING PREGNANCY: ICD-10-CM

## 2020-01-17 DIAGNOSIS — K83.1 CHOLESTASIS DURING PREGNANCY, ANTEPARTUM: ICD-10-CM

## 2020-01-17 DIAGNOSIS — B00.9 HERPES VIRUS INFECTION IN MOTHER DURING PREGNANCY, ANTEPARTUM: ICD-10-CM

## 2020-01-17 DIAGNOSIS — O26.892 DYSURIA DURING PREGNANCY IN SECOND TRIMESTER: ICD-10-CM

## 2020-01-17 DIAGNOSIS — R30.0 DYSURIA DURING PREGNANCY IN SECOND TRIMESTER: ICD-10-CM

## 2020-01-17 DIAGNOSIS — O26.619 CHOLESTASIS DURING PREGNANCY, ANTEPARTUM: ICD-10-CM

## 2020-01-17 LAB
BILIRUB UR QL STRIP: NEGATIVE
GLUCOSE UR-MCNC: NORMAL MG/DL
KETONES P FAST UR STRIP-MCNC: NEGATIVE MG/DL
PH UR STRIP: 6 [PH] (ref 4.6–8)
PROT UR QL STRIP: NORMAL
SP GR UR STRIP: 1.03 (ref 1–1.03)
UA UROBILINOGEN AMB POC: NORMAL (ref 0.2–1)
URINALYSIS CLARITY POC: NORMAL
URINALYSIS COLOR POC: YELLOW
URINE BLOOD POC: NORMAL
URINE LEUKOCYTES POC: NEGATIVE
URINE NITRITES POC: POSITIVE

## 2020-01-17 PROCEDURE — 76819 FETAL BIOPHYS PROFIL W/O NST: CPT | Performed by: OBSTETRICS & GYNECOLOGY

## 2020-01-17 RX ORDER — CEPHALEXIN 500 MG/1
500 CAPSULE ORAL 4 TIMES DAILY
Qty: 28 CAP | Refills: 0 | Status: SHIPPED | OUTPATIENT
Start: 2020-01-17 | End: 2020-01-23

## 2020-01-17 NOTE — PROGRESS NOTES
Chief Complaint   Patient presents with    Routine Prenatal Visit     36w4d    Leakage of Fluid: NO  Vaginal Bleeding: NO  Fetal Movement: YES  Prenatal vitamins: YES  Having Contractions: NO  Pain: NO    Visit Vitals  BP 98/62 (BP 1 Location: Left arm, BP Patient Position: Sitting)   Pulse 88   Temp 98.2 °F (36.8 °C) (Oral)   Resp 16   Ht 5' 1\" (1.549 m)   Wt 221 lb (100.2 kg)   LMP 05/06/2019   SpO2 95%   BMI 41.76 kg/m²

## 2020-01-17 NOTE — TELEPHONE ENCOUNTER
Called patient with [employer]  [phone] to inform her of UTI and antibiotics will be sent to pharmacy. Confirmed pharmacy with [facility] on file. Patient verbalized understanding.

## 2020-01-17 NOTE — PROGRESS NOTES
Return OB Visit   Itching is better on ursodiol, good FM     Objective:   BP 98/62 (BP 1 Location: Left arm, BP Patient Position: Sitting)   Pulse 88   Temp 98.2 °F (36.8 °C) (Oral)   Resp 16   Ht 5' 1\" (1.549 m)   Wt 221 lb (100.2 kg)   LMP 2019   SpO2 95%   BMI 41.76 kg/m²     See flowsheet     Assessment       ICD-10-CM ICD-9-CM    1. Supervision of high risk pregnancy, antepartum O09.90 V23.9    2. Iron deficiency anemia during pregnancy O99.019 648.20     D50.9 280.9    3. Dysuria during pregnancy in second trimester O26.892 646.83 AMB POC URINALYSIS DIP STICK AUTO W/ MICRO    R30.0 788.1 cephALEXin (KEFLEX) 500 mg capsule   4. Cholestasis during pregnancy, antepartum O26.619 646.73     K83.1 576.8    5. Antepartum multigravida of advanced maternal age O12.46 65.56    10. Polyhydramnios, antepartum, single or unspecified fetus O40. 9XX0 657.03    7. Herpes virus infection in mother during pregnancy, antepartum O98.519 647.63     B00.9 054.9      Plan   35yo  @ 36w4d  1. IUP: RH pos, s/p flu and tdap, GBS pos  2. LAY: followed by H/O,was receiving iron infusions, last 10/25, on iron BID+bowel regimen. Hbg now normal.  3.  UTI: recurrent, treating again   4. AMA: ASA  5. Polyhydramnios: MYRA now 25, weekly testing with MFM  6. Hx GDM: 1' GTT close to 200 and 3' very borderline. Keeping a log, PP are borderline, will continue to have her check. 7.  HSV: dx this pregnancy, is on ppx  8. Morbid obesity: EFW 40th% on   9. ICP: started ursodiol, scheduled for IOL at 37 wk  at 4pm for jones bulb. Orders Placed This Encounter    AMB POC URINALYSIS DIP STICK AUTO W/ MICRO    cephALEXin (KEFLEX) 500 mg capsule     Sig: Take 1 Cap by mouth four (4) times daily for 7 days.      Dispense:  28 Cap     Refill:  0         Cyndi Gomez,

## 2020-01-20 ENCOUNTER — HOSPITAL ENCOUNTER (INPATIENT)
Age: 35
LOS: 3 days | Discharge: HOME | DRG: 540 | End: 2020-01-23
Attending: FAMILY MEDICINE | Admitting: FAMILY MEDICINE
Payer: MEDICAID

## 2020-01-20 LAB
BASOPHILS # BLD: 0 K/UL (ref 0–0.1)
BASOPHILS NFR BLD: 0 % (ref 0–1)
DIFFERENTIAL METHOD BLD: ABNORMAL
EOSINOPHIL # BLD: 0.9 K/UL (ref 0–0.4)
EOSINOPHIL NFR BLD: 7 % (ref 0–7)
ERYTHROCYTE [DISTWIDTH] IN BLOOD BY AUTOMATED COUNT: 16.9 % (ref 11.5–14.5)
GLUCOSE BLD STRIP.AUTO-MCNC: 116 MG/DL (ref 65–100)
HCT VFR BLD AUTO: 38 % (ref 35–47)
HGB BLD-MCNC: 12.9 G/DL (ref 11.5–16)
IMM GRANULOCYTES # BLD AUTO: 0.1 K/UL (ref 0–0.04)
IMM GRANULOCYTES NFR BLD AUTO: 1 % (ref 0–0.5)
LYMPHOCYTES # BLD: 1.6 K/UL (ref 0.8–3.5)
LYMPHOCYTES NFR BLD: 14 % (ref 12–49)
MCH RBC QN AUTO: 28.9 PG (ref 26–34)
MCHC RBC AUTO-ENTMCNC: 33.9 G/DL (ref 30–36.5)
MCV RBC AUTO: 85.2 FL (ref 80–99)
MONOCYTES # BLD: 0.9 K/UL (ref 0–1)
MONOCYTES NFR BLD: 8 % (ref 5–13)
NEUTS SEG # BLD: 8.2 K/UL (ref 1.8–8)
NEUTS SEG NFR BLD: 70 % (ref 32–75)
NRBC # BLD: 0 K/UL (ref 0–0.01)
NRBC BLD-RTO: 0 PER 100 WBC
PLATELET # BLD AUTO: 177 K/UL (ref 150–400)
RBC # BLD AUTO: 4.46 M/UL (ref 3.8–5.2)
SERVICE CMNT-IMP: ABNORMAL
WBC # BLD AUTO: 11.7 K/UL (ref 3.6–11)

## 2020-01-20 PROCEDURE — 74011250636 HC RX REV CODE- 250/636: Performed by: OBSTETRICS & GYNECOLOGY

## 2020-01-20 PROCEDURE — 10907ZC DRAINAGE OF AMNIOTIC FLUID, THERAPEUTIC FROM PRODUCTS OF CONCEPTION, VIA NATURAL OR ARTIFICIAL OPENING: ICD-10-PCS | Performed by: OBSTETRICS & GYNECOLOGY

## 2020-01-20 PROCEDURE — 82962 GLUCOSE BLOOD TEST: CPT

## 2020-01-20 PROCEDURE — 74011000258 HC RX REV CODE- 258: Performed by: OBSTETRICS & GYNECOLOGY

## 2020-01-20 PROCEDURE — 59200 INSERT CERVICAL DILATOR: CPT | Performed by: OBSTETRICS & GYNECOLOGY

## 2020-01-20 PROCEDURE — 65270000029 HC RM PRIVATE

## 2020-01-20 PROCEDURE — 74011250637 HC RX REV CODE- 250/637: Performed by: STUDENT IN AN ORGANIZED HEALTH CARE EDUCATION/TRAINING PROGRAM

## 2020-01-20 PROCEDURE — 74011250636 HC RX REV CODE- 250/636: Performed by: FAMILY MEDICINE

## 2020-01-20 PROCEDURE — 75410000002 HC LABOR FEE PER 1 HR: Performed by: OBSTETRICS & GYNECOLOGY

## 2020-01-20 PROCEDURE — 85025 COMPLETE CBC W/AUTO DIFF WBC: CPT

## 2020-01-20 PROCEDURE — 36415 COLL VENOUS BLD VENIPUNCTURE: CPT

## 2020-01-20 RX ORDER — CEPHALEXIN 250 MG/1
500 CAPSULE ORAL EVERY 6 HOURS
Status: DISCONTINUED | OUTPATIENT
Start: 2020-01-20 | End: 2020-01-23 | Stop reason: HOSPADM

## 2020-01-20 RX ORDER — ONDANSETRON 2 MG/ML
4 INJECTION INTRAMUSCULAR; INTRAVENOUS
Status: DISCONTINUED | OUTPATIENT
Start: 2020-01-20 | End: 2020-01-21 | Stop reason: HOSPADM

## 2020-01-20 RX ORDER — ACYCLOVIR 400 MG/1
400 TABLET ORAL DAILY
COMMUNITY
End: 2020-01-23

## 2020-01-20 RX ORDER — MAG HYDROX/ALUMINUM HYD/SIMETH 200-200-20
30 SUSPENSION, ORAL (FINAL DOSE FORM) ORAL
Status: DISCONTINUED | OUTPATIENT
Start: 2020-01-20 | End: 2020-01-21 | Stop reason: HOSPADM

## 2020-01-20 RX ORDER — OXYTOCIN/0.9 % SODIUM CHLORIDE 30/500 ML
0-20 PLASTIC BAG, INJECTION (ML) INTRAVENOUS
Status: DISCONTINUED | OUTPATIENT
Start: 2020-01-21 | End: 2020-01-21

## 2020-01-20 RX ORDER — SODIUM CHLORIDE 0.9 % (FLUSH) 0.9 %
5-40 SYRINGE (ML) INJECTION AS NEEDED
Status: DISCONTINUED | OUTPATIENT
Start: 2020-01-20 | End: 2020-01-23 | Stop reason: HOSPADM

## 2020-01-20 RX ORDER — SODIUM CHLORIDE, SODIUM LACTATE, POTASSIUM CHLORIDE, CALCIUM CHLORIDE 600; 310; 30; 20 MG/100ML; MG/100ML; MG/100ML; MG/100ML
125 INJECTION, SOLUTION INTRAVENOUS CONTINUOUS
Status: DISCONTINUED | OUTPATIENT
Start: 2020-01-21 | End: 2020-01-23 | Stop reason: HOSPADM

## 2020-01-20 RX ORDER — BUTORPHANOL TARTRATE 2 MG/ML
1 INJECTION INTRAMUSCULAR; INTRAVENOUS
Status: DISCONTINUED | OUTPATIENT
Start: 2020-01-20 | End: 2020-01-23 | Stop reason: HOSPADM

## 2020-01-20 RX ORDER — NALOXONE HYDROCHLORIDE 0.4 MG/ML
0.4 INJECTION, SOLUTION INTRAMUSCULAR; INTRAVENOUS; SUBCUTANEOUS AS NEEDED
Status: DISCONTINUED | OUTPATIENT
Start: 2020-01-20 | End: 2020-01-21 | Stop reason: HOSPADM

## 2020-01-20 RX ORDER — LIDOCAINE HYDROCHLORIDE 10 MG/ML
20 INJECTION INFILTRATION; PERINEURAL ONCE
Status: ACTIVE | OUTPATIENT
Start: 2020-01-20 | End: 2020-01-21

## 2020-01-20 RX ADMIN — SODIUM CHLORIDE, SODIUM LACTATE, POTASSIUM CHLORIDE, AND CALCIUM CHLORIDE 125 ML/HR: 600; 310; 30; 20 INJECTION, SOLUTION INTRAVENOUS at 21:50

## 2020-01-20 RX ADMIN — CEPHALEXIN 500 MG: 250 CAPSULE ORAL at 18:20

## 2020-01-20 RX ADMIN — PENICILLIN G POTASSIUM 2.5 MILLION UNITS: 20000000 POWDER, FOR SOLUTION INTRAVENOUS at 21:54

## 2020-01-20 RX ADMIN — SODIUM CHLORIDE, SODIUM LACTATE, POTASSIUM CHLORIDE, AND CALCIUM CHLORIDE 500 ML: 600; 310; 30; 20 INJECTION, SOLUTION INTRAVENOUS at 21:05

## 2020-01-20 RX ADMIN — SODIUM CHLORIDE 5 MILLION UNITS: 900 INJECTION, SOLUTION INTRAVENOUS at 18:20

## 2020-01-20 RX ADMIN — CEPHALEXIN 500 MG: 250 CAPSULE ORAL at 23:58

## 2020-01-20 NOTE — H&P
History & Physical    Name: Diogo Constantino MRN: [id number]  SSN: [id number]    YOB: 1985  Age: 27 y.o. Sex: female      Subjective:     Estimated Date of Delivery: 2/10/20  OB History    Para Term  AB Living   5 3 3 0 1 3   SAB TAB Ectopic Molar Multiple Live Births   1 0 0 0   3      # Outcome Date GA Lbr Goyo/2nd Weight Sex Delivery Anes PTL Lv   5 Current            4 Term    8 lb (3.629 kg) M   N DOMINGA      Complications: Gestational diabetes   3 SAB 2012           2 Term 2005   7 lb (3.175 kg) M    DOMINGA   1 Term     F Vag-Spont  N DOMINGA      Birth Comments: Home delivery       Ms. Jerome Valentine is a [id number] at 37w0d presenting for IoL due to cholestasis of pregnancy. Prenatal course was complicated by LAY (requiring Fe infusions), AMA, hx of GDM, HSV, morbid obesity, recurrent UTIs and polyhydramnios. Please see prenatal records for details. Today the pt is scheduled for cervical ripening following by induction tomorrow morning. The pt states that she is overall feeling well and excited for the upcoming deliver. She denies fevers, chills, cough, congestion, abdominal pain, nausea, vomiting, headaches or visual disturbances. The pt does endorse improving dysuria and pruritis. Past Medical History:   Diagnosis Date    Anemia     Gestational diabetes     Herpes simplex virus (HSV) infection     Liver disease      No past surgical history on file. Social History     Occupational History    Not on file   Tobacco Use    Smoking status: Never Smoker    Smokeless tobacco: Never Used   Substance and Sexual Activity    Alcohol use: Not Currently    Drug use: Never    Sexual activity: Yes     Partners: Male     Birth control/protection: None     No family history on file. No Known Allergies  Prior to Admission medications    Medication Sig Start Date End Date Taking?  Authorizing Provider   ferrous sulfate 325 mg (65 mg iron) tablet Take 1 Tab by mouth two (2) times daily (after meals). 10/4/19  Yes Anusha Reeder MD   prenatal vit-calcium-iron-fa (PRENATAL PLUS WITH CALCIUM) 27 mg iron- 1 mg tab Take 1 Tab by mouth daily. 9/12/19  Yes Isha Dorantes MD   aspirin 81 mg chewable tablet Take 1 Tab by mouth daily. 9/12/19  Yes Isha Dorantes MD   cephALEXin (KEFLEX) 500 mg capsule Take 1 Cap by mouth four (4) times daily for 7 days. 1/17/20 1/24/20  Cyndi Gomez,    ursodiol (ACTIGALL) 250 mg tablet Take 1 Tab by mouth two (2) times a day for 12 days. 1/8/20 1/20/20  Cyndi Gomez DO   miconazole (MICOTIN) 2 % vaginal cream Insert 1 Applicator into vagina nightly. 12/9/19   Cyndi Gomez, DO   ascorbic acid, vitamin C, (VITAMIN C) 500 mg tablet Take  by mouth daily. 1/20/20  Provider, Historical   senna-docusate (PERICOLACE) 8.6-50 mg per tablet Take 1 Tab by mouth daily. 10/4/19 1/20/20  Anusha Reeder MD   pyridoxine, vitamin B6, (VITAMIN B-6) 25 mg tablet Take 1 Tab by mouth daily. 9/16/19   Isha Dorantes MD   doxylamine succinate (UNISOM) 25 mg tablet Take 1 Tab by mouth nightly as needed for Sleep. 9/16/19 1/20/20  Isha Dorantes MD        Review of Systems: Review of Systems   Constitutional: Negative for chills, fatigue and fever. HENT: Negative for congestion, rhinorrhea, sinus pressure and sore throat. Eyes: Negative for pain and visual disturbance. Respiratory: Negative for cough, shortness of breath and wheezing. Cardiovascular: Negative for chest pain and palpitations. Gastrointestinal: Negative for constipation, diarrhea, nausea and vomiting. Endocrine: Negative for polydipsia and polyuria. Genitourinary: Negative for dysuria, frequency and hematuria. Musculoskeletal: Negative for arthralgias and myalgias. Skin: Negative for rash and wound. Allergic/Immunologic: Negative for immunocompromised state. Neurological: Negative for dizziness, weakness and headaches. Psychiatric/Behavioral: Negative for agitation.  The patient is not nervous/anxious. Objective:     Vitals:  Vitals:    01/20/20 1706   Weight: 221 lb (100.2 kg)   Height: 5' 1\" (1.549 m)        Physical Exam:  Patient without distress. Heart: Regular rate and rhythm, S1S2 present or without murmur or extra heart sounds  Lung: clear to auscultation throughout lung fields, no wheezes, no rales, no rhonchi and normal respiratory effort  Back: costovertebral angle tenderness absent  Abdomen: soft, nontender  Fundus: soft and non tender  Lower Extremities: No edema, erythema or tenderness  Membranes:  Intact  Fetal Heart Rate: Reactive  Baseline: 160 per minute  Variability: moderate  Accelerations: yes  Decelerations: none  Uterine contractions: none          Prenatal Labs:   No results found for: RUBELLAEXT, GRBSEXT, HBSAGEXT, HIVEXT, RPREXT, GONNOEXT, CHLAMEXT, RUBELLAEXT, GRBSEXT, HBSAGEXT, HIVEXT, RPREXT, GONNOEXT, CHLAMEXT    Impression/Plan:     Ms. Tim Jett is a [id number] at 37w0d presenting for IoL due to cholestasis of pregnancy. Prenatal course was complicated by LAY (requiring Fe infusions), AMA, hx of GDM, HSV, morbid obesity, recurrent UTIs and polyhydramnios. Plan:   1. Admit for induction of labor. Jhonny Handy catheter placement now, pcn to start at midnight and pitocin to start at 0400   2. Group B Strep positive, will treat prophylactically with penicillin. Prenatal course  SIUP: RH pos, s/p flu and tdap, GBS pos. EFW 40% on 1/2/20. Intrahepatic cholestasis of Pregnancy: On ursodiol     LAY: POA Hgb 12.9. Followed by H/O, received x2 iron infusions, last one 10/25, on iron BID+bowel regimen. Recurrent UTI's: Last culture 1/10-E coli. Most recent Keflex 500mg QID starting 1/17/20   -Continue Kelfex 500QID until 1/24/20 @ 1200 for 7 day course    Hx GDM: 1' GTT close to 200 and 3' very borderline.   - BG check after diabetic dinner tonight, Q4H afterwards     AMA: On ASA this pregnancy    HSV I/II: dx this pregnancy.  On ppx from week 36w-->37w    Polyhydramnios: MYRA now 25, weekly testing with MFM         Patient seen with Dr. Bon Blair    Signed By:  Charmian Goltz, MD  Family Medicine Resident

## 2020-01-20 NOTE — PROGRESS NOTES
1600: Pt arrived to L&D room 208 for scheduled induction. 1700: Pt agrees for RAJEEV Toscano to translate to review medical history and discuss POC. 1710: Dr. Jose Alejandro Quinn and Dr. Denisse Coughlin at bedside to assess pt and discuss POC. SVE per MD, pt cervix closed. Per MD, will place cook catheter now for IOL. US performed, vertex presentation confirmed. Pt placed in stirups, cook catheter placed per Dr. Jose Alejandro Quinn. 80 cc NS inserted into uterine balloon and 60 cc NS inserted into vaginal balloon. Per MD, pt may eat dinner tonight and be NPO at midnight, start penicillin for GBS+ status now then every 4 hours, start pitocin at 0500, do not remove cook catheter if still in place. 1750: Dr. Denisse Coughlin confirmed no active HSV lesions noted on pt perineum. Pt just completed prophylactic acyclovir. 1910: Bedside and Verbal shift change report given to BANDAR Ibarra RN (oncoming nurse) by JOHN Vizcaino (offgoing nurse). Report included the following information SBAR, Kardex, Procedure Summary, Intake/Output, MAR, Accordion, Recent Results and Med Rec Status.

## 2020-01-20 NOTE — PROCEDURES
Cervical Catheter insertion procedure note    Melvin Madison is a ,  27 y.o. female who presents today far placement of a cervical catheter in the cervix for ripening. Her cervix is unfavorable and she is scheduled for induction tomorrow. She has elected to have a cervical catheter placement today. The risks, benefits and assets of the procedure were discussed. Her questions were answered. PROCEDURE: The vagina and cervix was prepped with Zephiran. A Cook catheter was placed through the cervix without difficulty. The uterine bulb was inflated with 80 cc of saline. The cervical balloon was inflated to 60 cc of saline. Bleeding was minimal. The patient's level of discomfort was minimal.   POST PROCEDURE: The patient tolerated the procedure well. There were no complications. She was  admitted as an outpatient for monitoring overnight.

## 2020-01-20 NOTE — PROGRESS NOTES
1631-Resident called and reported pt on unit, that she is on monitor, labs sent, but that admission still in progress.

## 2020-01-20 NOTE — PROGRESS NOTES
Estimated Date of Delivery: 2/10/20     x 3  AMA and obese  Hx GDM, Anemia  EFW 40th%  Mild poly 29cm  Weekly testing

## 2020-01-21 ENCOUNTER — ANESTHESIA (OUTPATIENT)
Dept: LABOR AND DELIVERY | Age: 35
DRG: 540 | End: 2020-01-21
Payer: MEDICAID

## 2020-01-21 ENCOUNTER — ANESTHESIA EVENT (OUTPATIENT)
Dept: LABOR AND DELIVERY | Age: 35
DRG: 540 | End: 2020-01-21
Payer: MEDICAID

## 2020-01-21 LAB
GLUCOSE BLD STRIP.AUTO-MCNC: 165 MG/DL (ref 65–100)
GLUCOSE BLD STRIP.AUTO-MCNC: 84 MG/DL (ref 65–100)
GLUCOSE BLD STRIP.AUTO-MCNC: 90 MG/DL (ref 65–100)
SERVICE CMNT-IMP: ABNORMAL
SERVICE CMNT-IMP: NORMAL
SERVICE CMNT-IMP: NORMAL

## 2020-01-21 PROCEDURE — 74011250637 HC RX REV CODE- 250/637: Performed by: STUDENT IN AN ORGANIZED HEALTH CARE EDUCATION/TRAINING PROGRAM

## 2020-01-21 PROCEDURE — 74011250636 HC RX REV CODE- 250/636: Performed by: OBSTETRICS & GYNECOLOGY

## 2020-01-21 PROCEDURE — 77030008684 HC TU ET CUF COVD -B: Performed by: ANESTHESIOLOGY

## 2020-01-21 PROCEDURE — 65270000029 HC RM PRIVATE

## 2020-01-21 PROCEDURE — 74011250636 HC RX REV CODE- 250/636: Performed by: ANESTHESIOLOGY

## 2020-01-21 PROCEDURE — 74011250636 HC RX REV CODE- 250/636: Performed by: FAMILY MEDICINE

## 2020-01-21 PROCEDURE — 82962 GLUCOSE BLOOD TEST: CPT

## 2020-01-21 PROCEDURE — 76210000016 HC OR PH I REC 1 TO 1.5 HR

## 2020-01-21 PROCEDURE — 74011250637 HC RX REV CODE- 250/637: Performed by: OBSTETRICS & GYNECOLOGY

## 2020-01-21 PROCEDURE — 75410000003 HC RECOV DEL/VAG/CSECN EA 0.5 HR: Performed by: OBSTETRICS & GYNECOLOGY

## 2020-01-21 PROCEDURE — 77030026438 HC STYL ET INTUB CARD -A: Performed by: ANESTHESIOLOGY

## 2020-01-21 PROCEDURE — 75410000002 HC LABOR FEE PER 1 HR: Performed by: OBSTETRICS & GYNECOLOGY

## 2020-01-21 PROCEDURE — 76010000391 HC C SECN FIRST 1 HR: Performed by: OBSTETRICS & GYNECOLOGY

## 2020-01-21 PROCEDURE — 76060000078 HC EPIDURAL ANESTHESIA: Performed by: OBSTETRICS & GYNECOLOGY

## 2020-01-21 RX ORDER — OXYTOCIN/0.9 % SODIUM CHLORIDE 20/1000 ML
125-500 PLASTIC BAG, INJECTION (ML) INTRAVENOUS ONCE
Status: ACTIVE | OUTPATIENT
Start: 2020-01-21 | End: 2020-01-22

## 2020-01-21 RX ORDER — HYDROMORPHONE HYDROCHLORIDE 1 MG/ML
.25-1 INJECTION, SOLUTION INTRAMUSCULAR; INTRAVENOUS; SUBCUTANEOUS
Status: DISCONTINUED | OUTPATIENT
Start: 2020-01-21 | End: 2020-01-21 | Stop reason: HOSPADM

## 2020-01-21 RX ORDER — OXYCODONE AND ACETAMINOPHEN 5; 325 MG/1; MG/1
2 TABLET ORAL
Status: DISCONTINUED | OUTPATIENT
Start: 2020-01-21 | End: 2020-01-23 | Stop reason: HOSPADM

## 2020-01-21 RX ORDER — IBUPROFEN 800 MG/1
800 TABLET ORAL EVERY 8 HOURS
Status: DISCONTINUED | OUTPATIENT
Start: 2020-01-21 | End: 2020-01-23 | Stop reason: HOSPADM

## 2020-01-21 RX ORDER — SODIUM CHLORIDE 0.9 % (FLUSH) 0.9 %
5-40 SYRINGE (ML) INJECTION AS NEEDED
Status: DISCONTINUED | OUTPATIENT
Start: 2020-01-21 | End: 2020-01-21 | Stop reason: HOSPADM

## 2020-01-21 RX ORDER — OXYTOCIN 10 [USP'U]/ML
INJECTION, SOLUTION INTRAMUSCULAR; INTRAVENOUS AS NEEDED
Status: DISCONTINUED | OUTPATIENT
Start: 2020-01-21 | End: 2020-01-21 | Stop reason: HOSPADM

## 2020-01-21 RX ORDER — MORPHINE SULFATE 4 MG/ML
2 INJECTION INTRAVENOUS
Status: DISCONTINUED | OUTPATIENT
Start: 2020-01-21 | End: 2020-01-21 | Stop reason: HOSPADM

## 2020-01-21 RX ORDER — DOCUSATE SODIUM 100 MG/1
100 CAPSULE, LIQUID FILLED ORAL 2 TIMES DAILY
Status: DISCONTINUED | OUTPATIENT
Start: 2020-01-21 | End: 2020-01-23 | Stop reason: HOSPADM

## 2020-01-21 RX ORDER — ONDANSETRON 2 MG/ML
INJECTION INTRAMUSCULAR; INTRAVENOUS AS NEEDED
Status: DISCONTINUED | OUTPATIENT
Start: 2020-01-21 | End: 2020-01-21 | Stop reason: HOSPADM

## 2020-01-21 RX ORDER — SODIUM CHLORIDE 0.9 % (FLUSH) 0.9 %
5-40 SYRINGE (ML) INJECTION AS NEEDED
Status: DISCONTINUED | OUTPATIENT
Start: 2020-01-21 | End: 2020-01-23 | Stop reason: HOSPADM

## 2020-01-21 RX ORDER — SODIUM CHLORIDE 0.9 % (FLUSH) 0.9 %
5-40 SYRINGE (ML) INJECTION EVERY 8 HOURS
Status: DISCONTINUED | OUTPATIENT
Start: 2020-01-21 | End: 2020-01-21 | Stop reason: HOSPADM

## 2020-01-21 RX ORDER — KETOROLAC TROMETHAMINE 30 MG/ML
INJECTION, SOLUTION INTRAMUSCULAR; INTRAVENOUS AS NEEDED
Status: DISCONTINUED | OUTPATIENT
Start: 2020-01-21 | End: 2020-01-21 | Stop reason: HOSPADM

## 2020-01-21 RX ORDER — NALOXONE HYDROCHLORIDE 0.4 MG/ML
0.4 INJECTION, SOLUTION INTRAMUSCULAR; INTRAVENOUS; SUBCUTANEOUS AS NEEDED
Status: DISCONTINUED | OUTPATIENT
Start: 2020-01-21 | End: 2020-01-23 | Stop reason: HOSPADM

## 2020-01-21 RX ORDER — SIMETHICONE 80 MG
80 TABLET,CHEWABLE ORAL AS NEEDED
Status: DISCONTINUED | OUTPATIENT
Start: 2020-01-21 | End: 2020-01-23 | Stop reason: HOSPADM

## 2020-01-21 RX ORDER — MORPHINE SULFATE IN 0.9 % NACL 150MG/30ML
PATIENT CONTROLLED ANALGESIA SYRINGE INTRAVENOUS
Status: DISCONTINUED | OUTPATIENT
Start: 2020-01-21 | End: 2020-01-22

## 2020-01-21 RX ORDER — SODIUM CHLORIDE 0.9 % (FLUSH) 0.9 %
5-40 SYRINGE (ML) INJECTION EVERY 8 HOURS
Status: DISCONTINUED | OUTPATIENT
Start: 2020-01-21 | End: 2020-01-21

## 2020-01-21 RX ORDER — SODIUM CHLORIDE, SODIUM LACTATE, POTASSIUM CHLORIDE, CALCIUM CHLORIDE 600; 310; 30; 20 MG/100ML; MG/100ML; MG/100ML; MG/100ML
125 INJECTION, SOLUTION INTRAVENOUS CONTINUOUS
Status: DISCONTINUED | OUTPATIENT
Start: 2020-01-21 | End: 2020-01-21 | Stop reason: HOSPADM

## 2020-01-21 RX ORDER — SUCCINYLCHOLINE CHLORIDE 20 MG/ML
INJECTION INTRAMUSCULAR; INTRAVENOUS AS NEEDED
Status: DISCONTINUED | OUTPATIENT
Start: 2020-01-21 | End: 2020-01-21 | Stop reason: HOSPADM

## 2020-01-21 RX ORDER — CEFAZOLIN SODIUM 1 G/3ML
INJECTION, POWDER, FOR SOLUTION INTRAMUSCULAR; INTRAVENOUS AS NEEDED
Status: DISCONTINUED | OUTPATIENT
Start: 2020-01-21 | End: 2020-01-21 | Stop reason: HOSPADM

## 2020-01-21 RX ORDER — DIPHENHYDRAMINE HYDROCHLORIDE 50 MG/ML
12.5 INJECTION, SOLUTION INTRAMUSCULAR; INTRAVENOUS AS NEEDED
Status: DISCONTINUED | OUTPATIENT
Start: 2020-01-21 | End: 2020-01-21 | Stop reason: HOSPADM

## 2020-01-21 RX ORDER — KETOROLAC TROMETHAMINE 30 MG/ML
30 INJECTION, SOLUTION INTRAMUSCULAR; INTRAVENOUS
Status: DISPENSED | OUTPATIENT
Start: 2020-01-21 | End: 2020-01-22

## 2020-01-21 RX ORDER — DEXAMETHASONE SODIUM PHOSPHATE 4 MG/ML
INJECTION, SOLUTION INTRA-ARTICULAR; INTRALESIONAL; INTRAMUSCULAR; INTRAVENOUS; SOFT TISSUE AS NEEDED
Status: DISCONTINUED | OUTPATIENT
Start: 2020-01-21 | End: 2020-01-21 | Stop reason: HOSPADM

## 2020-01-21 RX ORDER — DIPHENHYDRAMINE HCL 25 MG
25 CAPSULE ORAL
Status: DISCONTINUED | OUTPATIENT
Start: 2020-01-21 | End: 2020-01-23 | Stop reason: HOSPADM

## 2020-01-21 RX ORDER — ZOLPIDEM TARTRATE 5 MG/1
10 TABLET ORAL
Status: DISCONTINUED | OUTPATIENT
Start: 2020-01-21 | End: 2020-01-23 | Stop reason: HOSPADM

## 2020-01-21 RX ORDER — SODIUM CHLORIDE, SODIUM LACTATE, POTASSIUM CHLORIDE, CALCIUM CHLORIDE 600; 310; 30; 20 MG/100ML; MG/100ML; MG/100ML; MG/100ML
125 INJECTION, SOLUTION INTRAVENOUS CONTINUOUS
Status: DISCONTINUED | OUTPATIENT
Start: 2020-01-21 | End: 2020-01-23 | Stop reason: HOSPADM

## 2020-01-21 RX ORDER — FENTANYL CITRATE 50 UG/ML
INJECTION, SOLUTION INTRAMUSCULAR; INTRAVENOUS AS NEEDED
Status: DISCONTINUED | OUTPATIENT
Start: 2020-01-21 | End: 2020-01-21 | Stop reason: HOSPADM

## 2020-01-21 RX ORDER — PROPOFOL 10 MG/ML
INJECTION, EMULSION INTRAVENOUS AS NEEDED
Status: DISCONTINUED | OUTPATIENT
Start: 2020-01-21 | End: 2020-01-21 | Stop reason: HOSPADM

## 2020-01-21 RX ADMIN — FENTANYL CITRATE 100 MCG: 50 INJECTION, SOLUTION INTRAMUSCULAR; INTRAVENOUS at 12:29

## 2020-01-21 RX ADMIN — Medication 6 MILLI-UNITS/MIN: at 06:23

## 2020-01-21 RX ADMIN — SUCCINYLCHOLINE CHLORIDE 100 MG: 20 INJECTION, SOLUTION INTRAMUSCULAR; INTRAVENOUS; PARENTERAL at 12:23

## 2020-01-21 RX ADMIN — OXYTOCIN 20 UNITS: 10 INJECTION, SOLUTION INTRAMUSCULAR; INTRAVENOUS at 13:06

## 2020-01-21 RX ADMIN — DEXAMETHASONE SODIUM PHOSPHATE 4 MG: 4 INJECTION, SOLUTION INTRAMUSCULAR; INTRAVENOUS at 12:31

## 2020-01-21 RX ADMIN — ONDANSETRON HYDROCHLORIDE 4 MG: 2 SOLUTION INTRAMUSCULAR; INTRAVENOUS at 12:31

## 2020-01-21 RX ADMIN — HYDROMORPHONE HYDROCHLORIDE 1 MG: 1 INJECTION, SOLUTION INTRAMUSCULAR; INTRAVENOUS; SUBCUTANEOUS at 13:27

## 2020-01-21 RX ADMIN — HYDROMORPHONE HYDROCHLORIDE 0.5 MG: 1 INJECTION, SOLUTION INTRAMUSCULAR; INTRAVENOUS; SUBCUTANEOUS at 13:45

## 2020-01-21 RX ADMIN — CEPHALEXIN 500 MG: 250 CAPSULE ORAL at 11:30

## 2020-01-21 RX ADMIN — CEPHALEXIN 500 MG: 250 CAPSULE ORAL at 19:04

## 2020-01-21 RX ADMIN — DOCUSATE SODIUM 100 MG: 100 CAPSULE, LIQUID FILLED ORAL at 19:04

## 2020-01-21 RX ADMIN — PENICILLIN G POTASSIUM 2.5 MILLION UNITS: 20000000 POWDER, FOR SOLUTION INTRAVENOUS at 05:50

## 2020-01-21 RX ADMIN — SODIUM CHLORIDE, SODIUM LACTATE, POTASSIUM CHLORIDE, AND CALCIUM CHLORIDE: 600; 310; 30; 20 INJECTION, SOLUTION INTRAVENOUS at 13:16

## 2020-01-21 RX ADMIN — KETOROLAC TROMETHAMINE 30 MG: 30 INJECTION INTRAMUSCULAR; INTRAVENOUS at 12:49

## 2020-01-21 RX ADMIN — PENICILLIN G POTASSIUM 2.5 MILLION UNITS: 20000000 POWDER, FOR SOLUTION INTRAVENOUS at 10:30

## 2020-01-21 RX ADMIN — SODIUM CHLORIDE, SODIUM LACTATE, POTASSIUM CHLORIDE, AND CALCIUM CHLORIDE 125 ML/HR: 600; 310; 30; 20 INJECTION, SOLUTION INTRAVENOUS at 01:56

## 2020-01-21 RX ADMIN — PROPOFOL 180 MG: 10 INJECTION, EMULSION INTRAVENOUS at 12:23

## 2020-01-21 RX ADMIN — SODIUM CHLORIDE, SODIUM LACTATE, POTASSIUM CHLORIDE, AND CALCIUM CHLORIDE 125 ML/HR: 600; 310; 30; 20 INJECTION, SOLUTION INTRAVENOUS at 14:00

## 2020-01-21 RX ADMIN — OXYTOCIN 30 UNITS: 10 INJECTION, SOLUTION INTRAMUSCULAR; INTRAVENOUS at 12:25

## 2020-01-21 RX ADMIN — Medication: at 15:00

## 2020-01-21 RX ADMIN — CEPHALEXIN 500 MG: 250 CAPSULE ORAL at 05:48

## 2020-01-21 RX ADMIN — FENTANYL CITRATE 100 MCG: 50 INJECTION, SOLUTION INTRAMUSCULAR; INTRAVENOUS at 12:27

## 2020-01-21 RX ADMIN — FENTANYL CITRATE 50 MCG: 50 INJECTION, SOLUTION INTRAMUSCULAR; INTRAVENOUS at 12:31

## 2020-01-21 RX ADMIN — SODIUM CHLORIDE, SODIUM LACTATE, POTASSIUM CHLORIDE, AND CALCIUM CHLORIDE 125 ML/HR: 600; 310; 30; 20 INJECTION, SOLUTION INTRAVENOUS at 11:30

## 2020-01-21 RX ADMIN — PENICILLIN G POTASSIUM 2.5 MILLION UNITS: 20000000 POWDER, FOR SOLUTION INTRAVENOUS at 01:54

## 2020-01-21 RX ADMIN — CEFAZOLIN SODIUM 2 G: 1 INJECTION, POWDER, FOR SOLUTION INTRAMUSCULAR; INTRAVENOUS at 12:21

## 2020-01-21 RX ADMIN — Medication 2 MILLI-UNITS/MIN: at 05:05

## 2020-01-21 NOTE — L&D DELIVERY NOTE
Delivery Summary    Patient: Avinash Carter MRN: [id number]  SSN: [id number]    YOB: 1985  Age: 27 y.o. Sex: female        Information for the patient's :  Gerda Chavez, Female Sandy Narayan [[id number]]       Labor Events:    Labor: No    Steroids: None   Cervical Ripening Date/Time: 2020 8:18 AM   Cervical Ripening Type: Camejo/EASI   Antibiotics During Labor: No   Rupture Identifier:      Rupture Date/Time: 2020 10:00 AM   Rupture Type: AROM   Amniotic Fluid Volume: Polyhydramic    Amniotic Fluid Description: Clear    Amniotic Fluid Odor: None    Induction: AROM; Oxytocin       Induction Date/Time:        Indications for Induction: Diabetes    Augmentation: None   Augmentation Date/Time:      Indications for Augmentation:     Labor complications:  Other (comment)   Cord Prolapse   Additional complications:        Delivery Events:  Indications For Episiotomy:     Episiotomy: None   Perineal Laceration(s):     Repaired:     Periurethral Laceration Location:      Repaired:     Labial Laceration Location:     Repaired:     Sulcal Laceration Location:     Repaired:     Vaginal Laceration Location:     Repaired:     Cervical Laceration Location:     Repaired:     Repair Suture: None   Number of Repair Packets:     Estimated Blood Loss (ml):  ml     Delivery Date: 2020    Delivery Time: 12:24 PM   Delivery Type: , Low Transverse     Details    Trial of Labor: Yes   Primary/Repeat: Primary   Priority: Emergency   Indications:  Cord Prolapse       Sex:  Female     Gestational Age: 37w1d  Delivery Clinician:  Mirlande Root  Living Status: Living   Delivery Location: L&D  OR LD OR          APGARS  One minute Five minutes Ten minutes   Skin color: 1   1        Heart rate: 2   2        Grimace: 2   2        Muscle tone: 2   2        Breathin   2        Totals: 9   9          Presentation: Vertex    Position: Left Occiput Anterior  Resuscitation Method:  Suctioning-bulb; Tactile Stimulation     Meconium Stained: None      Cord Information: 3 Vessels  Complications: Prolapsed  Cord around:    Delayed cord clamping? No  Cord clamped date/time:2020 12:24 PM  Disposition of Cord Blood: Lab    Blood Gases Sent?: No    Placenta:  Date/Time: 2020 12:27 PM  Removal: Manual Removal      Appearance: Normal;Intact      Measurements:  Birth Weight: 2.89 kg      Birth Length: 45.7 cm      Head Circumference: 34.2 cm      Chest Circumference: 32 cm     Abdominal Girth: 30 cm    Other Providers:   LARRY VASQUEZ;HARITHA DIXON;YASMINE BANG;CIARA RENDON;TIFFANY MACKAY;AYLA JAIME;BO THOMAS;CIARA JEFFERSON;JEFFREY GANDHI;RAYMOND DIALLO;TR MIRELES;PRESLEY SINGER;JOSÉ MIGUEL RAY;EDUARD POWERS;JAKUB PEÑA, Obstetrician;Primary Nurse;Primary  Nurse;Nicu Nurse;Neonatologist;Respiratory Therapist;Charge Nurse;Staff Nurse;Staff Nurse;Staff Nurse; Anesthesiologist;Anesthesiologist;Scrub Tech;Surgeon Assistant; Resident             Group B Strep:   Lab Results   Component Value Date/Time    GrBStrep, External POSITIVE 01/10/2020     Information for the patient's :  Nargis Coello, Female Gt Florianyaneli [[id number]]     Lab Results   Component Value Date/Time    ABO/Rh(D) O POSITIVE 2020 01:15 PM    DEVIN IgG NEG 2020 01:15 PM    Bilirubin if DEVIN pos: IF DIRECT KIMBERLY POSITIVE, BILIRUBIN TO FOLLOW 2020 01:15 PM     No results for input(s): PCO2CB, PO2CB, HCO3I, SO2I, IBD, PTEMPI, SPECTI, PHICB, ISITE, IDEV, IALLEN in the last 72 hours.

## 2020-01-21 NOTE — PROGRESS NOTES
Labor Progress Note  Patient seen, fetal heart rate and contraction pattern evaluated, patient examined. Pt tolerated Cook catheter overnight with minimal cramping. Denies vaginal bleeding or discharge. Catheter fell out @0730. Pit started at 0500  Patient Vitals for the past 4 hrs:   Temp Pulse Resp BP SpO2   01/21/20 0844     99 %   01/21/20 0839     98 %   01/21/20 0834     97 %   01/21/20 0829     97 %   01/21/20 0824     97 %   01/21/20 0819     97 %   01/21/20 0814     97 %   01/21/20 0809     98 %   01/21/20 0804     98 %   01/21/20 0759     98 %   01/21/20 0754     98 %   01/21/20 0749     98 %   01/21/20 0744     98 %   01/21/20 0739     98 %   01/21/20 0734     98 %   01/21/20 0732 97.8 °F (36.6 °C) 87 18 116/72    01/21/20 0646     97 %   01/21/20 0641     97 %   01/21/20 0640  83  106/62    01/21/20 0636     97 %   01/21/20 0631     98 %   01/21/20 0626     97 %   01/21/20 0625  83  102/62    01/21/20 0621     96 %   01/21/20 0616     96 %   01/21/20 0611     97 %   01/21/20 0610  82  103/63    01/21/20 0606     97 %   01/21/20 0601     97 %   01/21/20 0558  79  98/57    01/21/20 0550     98 %   01/21/20 0545     97 %   01/21/20 0540  75  98/59 97 %   01/21/20 0535     97 %   01/21/20 0530     98 %   01/21/20 0526  79  100/58    01/21/20 0525     97 %   01/21/20 0520     98 %   01/21/20 0515     97 %   01/21/20 0510 98.2 °F (36.8 °C) 84 16 102/60 97 %   01/21/20 0505     97 %   01/21/20 0500     98 %           Physical Exam:  Cervical Exam:  Cervical Exam  Membrane Status: Intact  Membranes:  Artificial Rupture of Membranes;  Amniotic Fluid: large amount of clear fluid  Uterine Activity: Frequency: Every 3 minutes, Duration: 45 seconds and Intensity: mild  Fetal Heart Rate: Reactive  Baseline: 165 per minute  Variability: moderate  Accelerations: yes  Decelerations: none    Assessment/Plan:  Ms. Vicki Ott is a [id number] at 42w4d presenting for IoL due to cholestasis of pregnancy. Prenatal course was complicated by LAY (requiring Fe infusions), AMA, hx of GDM, HSV, morbid obesity, recurrent UTIs and polyhydramnios. 1. Continue expectant management. Denies epidural. On PCN for GBS (+). Prenatal course  SIUP: RH pos, s/p flu and tdap, GBS pos. EFW 40% on 1/2/20.      Intrahepatic cholestasis of Pregnancy: On ursodiol in pregnancy     LAY: POA Hgb 12.9. Followed by H/O, received x2 iron infusions, last one 10/25, on iron BID+bowel regimen.      Recurrent UTI's: Last culture 1/10-E coli. Most recent Keflex 500mg QID starting 1/17/20   -Continue Kelfex 500QID until 1/24/20 @ 1200 for 7 day course     Hx GDM: 1' GTT close to 200 and 3' very borderline.   - BG check after diabetic dinner tonight, Q4H afterwards      AMA: On ASA this pregnancy     HSV I/II: Lesions noted 12/9/19, culture positive for HSV2. Treated with 7 day acyclovir. HSV I/II IgG positive but IgM negative. On ppx from week 36w-->37w. No lesions appreciated upon arrival     Polyhydramnios: MYRA now 25, max this pregnancy 32.        Patient seen with Dr. Evelia Schwab, MD  Family Medicine Resident

## 2020-01-21 NOTE — ANESTHESIA PREPROCEDURE EVALUATION
Relevant Problems   No relevant active problems       Anesthetic History               Review of Systems / Medical History      Pulmonary                   Neuro/Psych              Cardiovascular                       GI/Hepatic/Renal                Endo/Other    Diabetes    Morbid obesity     Other Findings   Comments: cholestasis           Physical Exam    Airway  Mallampati: III  TM Distance: 4 - 6 cm  Neck ROM: normal range of motion   Mouth opening: Normal     Cardiovascular  Regular rate and rhythm,  S1 and S2 normal,  no murmur, click, rub, or gallop  Rhythm: regular  Rate: normal         Dental  No notable dental hx       Pulmonary  Breath sounds clear to auscultation               Abdominal  GI exam deferred       Other Findings            Anesthetic Plan    ASA: 3, emergent  Anesthesia type: general          Induction: Intravenous  Anesthetic plan and risks discussed with: Patient      Due to stat nature prolapse cord, patient spoken to via traslator in the OR very briefly since baby was iin distress

## 2020-01-21 NOTE — PROGRESS NOTES
1910  Bedside and Verbal shift change report given to RAMY Ibarra RN (oncoming nurse) by Dre Kim RN (offgoing nurse). Report included the following information SBAR, Kardex, Procedure Summary, MAR, Accordion, Recent Results and Med Rec Status. Patient is resting comfortably in bed, and is eating dinner. Patient assessment completed. Kathleene Erichsen catheter in place, no bleeding or pain. VS are WNP. 2008  EF and TOCO places for NST    2110  500 ml fluid bolus started, for non-reactive tracing. Patient repositioned. Several times into side laying position. 2259  Patient repositioned into high fowlers position. FHR is difficult to trace. 0502  Pitocin started. Kathleene Erichsen still in place. 0700  Bedside and Verbal shift change report given to Ramona Dasilva RN (oncoming nurse) by Maya Salinas RN (offgoing nurse). Report included the following information SBAR, Kardex, Procedure Summary, Intake/Output, MAR, Accordion, Recent Results and Med Rec Status.

## 2020-01-21 NOTE — PROGRESS NOTES
01/21/20 9:54 AM  CM met with patient and her partner/IAM Rodríguez ([phone]) to complete initial assessment and to begin discharge planning. Demographics were reviewed and confirmed, the couple lives in the [facility]. Patient has three older children, two sons who are 10 and 15 and a 23year old daughter. IAM works and will return to work this weekend. Family supports available in the area to assist as needed. Patient plans to breast and bottlefeed formula. Patient has car seat, crib, clothing, and other necessary supplies. [facility] will provide medical follow up for the baby. Patient has [facility] and SNAP benefits and knows how to have the baby added to both benefits. Care Card application completed and submitted on 1/13/2020; it is in processing. [employer] to complete Medicaid application for both patient and baby following delivery.   Care Management Interventions  PCP Verified by CM: Yes([facility])  Mode of Transport at Discharge: Self  Transition of Care Consult (CM Consult): Discharge Planning  Current Support Network: Lives with Spouse, Family Lives Nearby  Confirm Follow Up Transport: Family  Discharge Location  Discharge Placement: Home with family assistance  DARREN Ross

## 2020-01-21 NOTE — PROGRESS NOTES
Called to see pt due to variable decelerations. Cervix now 7 cm dilated and -2 station however the head felt malpositioned back of head felt slightly shifted to the left and the infant neck could easily be palpated with the umbilal cord sitting within the akbar created between the head and the neck sitting lower than the head. Attempts were made to push the cord higher above the head however, as the head was not completely covering the cervix the cord continued to slip down. Terminal bradycardia was noted during this time period and the decision was made to proceed to the OR for an emergent c/s under general anesthesia. Sharri Erm was used to convey information to pt and her partner.

## 2020-01-21 NOTE — ANESTHESIA POSTPROCEDURE EVALUATION
Procedure(s):   SECTION. general    Anesthesia Post Evaluation      Multimodal analgesia: multimodal analgesia used between 6 hours prior to anesthesia start to PACU discharge  Patient location during evaluation: PACU  Patient participation: complete - patient participated  Level of consciousness: awake and alert  Airway patency: patent  Anesthetic complications: no  Cardiovascular status: acceptable  Respiratory status: acceptable  Hydration status: acceptable        Vitals Value Taken Time   /70 2020  1:20 PM   Temp 36.8 °C (98.2 °F) 2020  1:17 PM   Pulse 69 2020  1:25 PM   Resp 19 2020  1:25 PM   SpO2 96 % 2020  1:25 PM   Vitals shown include unvalidated device data.

## 2020-01-21 NOTE — OP NOTES
Section Delivery Procedure Note    Name: [id number] [place] Record Number: [id number]   YOB: 1985  Today's Date: 2020      Preoperative Diagnosis: prolapsed cord    Postoperative Diagnosis: same    Procedure: Low Transverse  Section    Surgeon(s): Russel Avendano MD    Surgical Assistants:   Circ-1: Delilah Coleman RN  Circ-2: Daniela Ambrosio; Hua Lubin RN; Hannah Dominguez RN  Scrub Tech-1: Alma TERESA  Surg Asst-1: Pameal Nava    Anesthesia: Spinal    Prophylactic Antibiotics: penicillin or Ancef         Fetal Description: luevano female    Birth Information:   Information for the patient's :  Gerda Chavez, Female Sandy Narayan [[id number]]          Umbilical Cord: 3 vessels present    Placenta:  manual removal    Specimens: * No specimens in log *     Implants: * No implants in log *    EBL: [id number]           Complications:  none      Procedure Detail:      After proper patient identification and consent, the patient was taken to the operating room. Camejo catheter had been placed using sterile technique. General anesthesia was administered and found to be adequate. .  The patient was prepped and draped in the normal sterile fashion. The abdomen was entered using the Pfannenstiel technique. The peritoneum was entered bluntely well superior to the bladder without any apparent injury. Abladder blade was placed. A low transverse uterine incision was made with the scalpel and extended with blunt finger dissection. The babys head was then delivered atraumatically. The nose and mouth were suctioned. The cord was clamped and cut and the baby was handed off to  staff in attendance. The placenta was delivered manually without difficulty  All placental fragments/membranes were removed. . The uterus was wiped clean with a moist lap pad and cleared of all clots and debris.  The uterine incision was closed in 2 layers, first with a running locked suture of 0-Vicryl, then with an imbricated layer. Adequate hemostasis was noted. Both tubes and ovaries appeared normal. The pericolic gutters were then lavaged clean with normal saline. Good hemostasis was again reassured The Petr retractor was removed. The fascia was closed with #1 vicryl  in a running fashion. Good hemostasis was assured. The incision was lavaged clean and small bleeders were coagulated with the bovie. Subcutaneous tissue was re approximated with interrupted plain gut sutures. The skin was closed with 4-0 monocryl in subcuticular fashion. The patient tolerated the procedure well. Sponge, lap, and needle counts were correct times three and the patient and baby were taken to recovery/postpartum room in stable condition.     Mae Martinez MD  January 21, 2020  1:27 PM

## 2020-01-21 NOTE — PERIOP NOTES
RN Yogesh Carrera at bedside to perform post op uterine assessment.  Have done so twice q15 minutes, see flow chart

## 2020-01-21 NOTE — PROGRESS NOTES
2482  Bedside and Verbal shift change report given to MARRY Stern (oncoming nurse) by BANDAR Ibarra RN (offgoing nurse). Report included the following information SBAR, Kardex and MAR   0732  [employer] phone Syriac interpretor [phone] utilized. Complete assessment done. Pt went to the BR to void and her cook cath fell out on it's own. Went over the plan of care with the pt. Pt aware she will have her cervix checked and her BOW broken . Pt plans to have a natural child birth. 0830  Pt sitting up in the bed watching TV denies any needs  0905  Pt up to the BR to void. Pt back to bed. Hard to trace baby on the FM d/t being very active and polyhydramniosis. 1000 Ultrasound done to verify vertex position. Dr Mcgrath Heading at the bedside to check the pt. SVE 6/50/-3 AROM copious amounts of clear amniotic fluid noted. 11:36 AM  Underpad changed for large amount of clear fluid. Pt denies any needs at the present time  [id number]  Dr Mcgrath Heading at the bedside to place FSE and IUPC. FSE placed FHR in 140's. IUPC placed. Dr Mcgrath Heading checking on the position of the babies head. Baby -4 station with head not engaged. Dr Mcgrath Heading able to feel umbilical cord next to the babies head. During this maneuvering FHR deceled to the 60's, at that time pitocin was stopped, IV LR increased to bolus rate of 999cc/hr, pt placed in trendelenburg position. Babies heart rate remained in 60's for 8 minutes without recovery to baseline. STAT c-section called and pt wheeled to the OR via bed. Anesthesia, scrub team, charge and nursing staff made aware via [employer]  [id number] Pt in the OR#1 moved from bed to stretcher and anesthesia resumed care of pt to intubate. 1400  Pt back to the room via stretcher from PACU.  [email or web address] Abdominal binder intact on abdomen. Pt denies the need for pain medication at the present time. 1430  Pt resting comfortably denies any needs  1500  PCA pump set up and verified.   Pt informed about the use of the PCA button. 0  Pt states she feels much better after her PCA was started  36  Pt holding her NB feeding her baby  5:11 PM  Pt denies any needs at the present time. PCA morphine maintained.   6:08 PM Pt denies any needs at the present time

## 2020-01-21 NOTE — PERIOP NOTES
TRANSFER - OUT REPORT:    Verbal report given to RN ZACK(name) on Avinash Carter  being transferred to [facility] for routine post - op       Report consisted of patients Situation, Background, Assessment and   Recommendations(SBAR). Information from the following report(s) SBAR and OR Summary was reviewed with the receiving nurse. Lines:   Peripheral IV 01/20/20 Right Forearm (Active)   Site Assessment Clean, dry, & intact 1/21/2020  1:16 PM   Phlebitis Assessment 0 1/21/2020  1:16 PM   Infiltration Assessment 0 1/21/2020  1:16 PM   Dressing Status Clean, dry, & intact; Clean;Occlusive 1/21/2020  1:16 PM   Dressing Type Tape;Transparent 1/21/2020  1:16 PM   Hub Color/Line Status Pink; Infusing 1/21/2020  1:16 PM   Alcohol Cap Used Yes 1/21/2020  1:16 PM        Opportunity for questions and clarification was provided.       Patient transported with:   O2 @ 2 liters  Registered Nurse

## 2020-01-22 LAB
BASOPHILS # BLD: 0 K/UL (ref 0–0.1)
BASOPHILS NFR BLD: 0 % (ref 0–1)
DIFFERENTIAL METHOD BLD: ABNORMAL
EOSINOPHIL # BLD: 0.3 K/UL (ref 0–0.4)
EOSINOPHIL NFR BLD: 2 % (ref 0–7)
ERYTHROCYTE [DISTWIDTH] IN BLOOD BY AUTOMATED COUNT: 16.7 % (ref 11.5–14.5)
HCT VFR BLD AUTO: 32.7 % (ref 35–47)
HGB BLD-MCNC: 10.9 G/DL (ref 11.5–16)
IMM GRANULOCYTES # BLD AUTO: 0.1 K/UL (ref 0–0.04)
IMM GRANULOCYTES NFR BLD AUTO: 1 % (ref 0–0.5)
LYMPHOCYTES # BLD: 2.5 K/UL (ref 0.8–3.5)
LYMPHOCYTES NFR BLD: 14 % (ref 12–49)
MCH RBC QN AUTO: 28.6 PG (ref 26–34)
MCHC RBC AUTO-ENTMCNC: 33.3 G/DL (ref 30–36.5)
MCV RBC AUTO: 85.8 FL (ref 80–99)
MONOCYTES # BLD: 1.4 K/UL (ref 0–1)
MONOCYTES NFR BLD: 7 % (ref 5–13)
NEUTS SEG # BLD: 13.9 K/UL (ref 1.8–8)
NEUTS SEG NFR BLD: 77 % (ref 32–75)
NRBC # BLD: 0 K/UL (ref 0–0.01)
NRBC BLD-RTO: 0 PER 100 WBC
PLATELET # BLD AUTO: 180 K/UL (ref 150–400)
RBC # BLD AUTO: 3.81 M/UL (ref 3.8–5.2)
WBC # BLD AUTO: 18.2 K/UL (ref 3.6–11)

## 2020-01-22 PROCEDURE — 74011250637 HC RX REV CODE- 250/637: Performed by: STUDENT IN AN ORGANIZED HEALTH CARE EDUCATION/TRAINING PROGRAM

## 2020-01-22 PROCEDURE — 85025 COMPLETE CBC W/AUTO DIFF WBC: CPT

## 2020-01-22 PROCEDURE — 65270000029 HC RM PRIVATE

## 2020-01-22 PROCEDURE — 74011250636 HC RX REV CODE- 250/636: Performed by: OBSTETRICS & GYNECOLOGY

## 2020-01-22 PROCEDURE — 74011250637 HC RX REV CODE- 250/637: Performed by: OBSTETRICS & GYNECOLOGY

## 2020-01-22 PROCEDURE — 36415 COLL VENOUS BLD VENIPUNCTURE: CPT

## 2020-01-22 RX ADMIN — IBUPROFEN 800 MG: 800 TABLET ORAL at 13:17

## 2020-01-22 RX ADMIN — KETOROLAC TROMETHAMINE 30 MG: 30 INJECTION, SOLUTION INTRAMUSCULAR; INTRAVENOUS at 01:46

## 2020-01-22 RX ADMIN — OXYCODONE HYDROCHLORIDE AND ACETAMINOPHEN 2 TABLET: 5; 325 TABLET ORAL at 20:22

## 2020-01-22 RX ADMIN — CEPHALEXIN 500 MG: 250 CAPSULE ORAL at 19:24

## 2020-01-22 RX ADMIN — CEPHALEXIN 500 MG: 250 CAPSULE ORAL at 13:16

## 2020-01-22 RX ADMIN — CEPHALEXIN 500 MG: 250 CAPSULE ORAL at 06:16

## 2020-01-22 RX ADMIN — IBUPROFEN 800 MG: 800 TABLET ORAL at 20:22

## 2020-01-22 RX ADMIN — OXYCODONE HYDROCHLORIDE AND ACETAMINOPHEN 2 TABLET: 5; 325 TABLET ORAL at 15:58

## 2020-01-22 RX ADMIN — DOCUSATE SODIUM 100 MG: 100 CAPSULE, LIQUID FILLED ORAL at 18:29

## 2020-01-22 RX ADMIN — CEPHALEXIN 500 MG: 250 CAPSULE ORAL at 23:56

## 2020-01-22 RX ADMIN — KETOROLAC TROMETHAMINE 30 MG: 30 INJECTION, SOLUTION INTRAMUSCULAR; INTRAVENOUS at 06:17

## 2020-01-22 RX ADMIN — OXYCODONE HYDROCHLORIDE AND ACETAMINOPHEN 2 TABLET: 5; 325 TABLET ORAL at 23:56

## 2020-01-22 RX ADMIN — DOCUSATE SODIUM 100 MG: 100 CAPSULE, LIQUID FILLED ORAL at 09:43

## 2020-01-22 RX ADMIN — SIMETHICONE CHEW TAB 80 MG 80 MG: 80 TABLET ORAL at 09:45

## 2020-01-22 RX ADMIN — OXYCODONE HYDROCHLORIDE AND ACETAMINOPHEN 1 TABLET: 5; 325 TABLET ORAL at 09:43

## 2020-01-22 RX ADMIN — CEPHALEXIN 500 MG: 250 CAPSULE ORAL at 01:27

## 2020-01-22 NOTE — ROUTINE PROCESS
Bedside shift change report given to Colleen Barker RN (oncoming nurse) by Wendie Steiner RN (offgoing nurse). Report included the following information SBAR, Kardex and MAR.

## 2020-01-22 NOTE — LACTATION NOTE
This note was copied from a baby's chart. Mom states still doesn't feel well enough to breast feed. States baby won't latch. Offered to assist, but mom states just gave baby 30 ccs of formula. Discussed importance of colostrum and supply and demand in [place] with mom.

## 2020-01-22 NOTE — LACTATION NOTE
This note was copied from a baby's chart. Mom states baby hasn't nursed yet, won't take her nipple. Just gave formula. Mom  Would like to take a nap. Wrapped baby and placed in bassinet. Asked mom to call [facility].

## 2020-01-22 NOTE — LACTATION NOTE
This note was copied from a baby's chart. Reviewed breastfeeding basics:  Supply and demand,  stomach size, early  Feeding cues, skin to skin, positioning and baby led latch-on, assymetrical latch with signs of good, deep latch vs shallow, feeding frequency and duration, and log sheet for tracking infant feedings and output. Breastfeeding Booklet and Warm line information given. Discussed typical  weight loss and the importance of infant weight checks with pediatrician 1-2 post discharge. Information discussed in [address] [place] [address] breast feeding booklet given and discussed with mom. Pt will successfully establish breastfeeding by feeding in response to early feeding cues   or wake every 3h, will obtain deep latch, and will keep log of feedings/output. Taught to BF at hunger cues and or q 2-3 hrs and to offer 10-20 drops of hand expressed colostrum at any non-feeds. Breast Assessment  Left Breast: Medium, Large  Left Nipple: Everted, Intact  Right Breast: Medium, Large  Right Nipple: Everted, Intact  Breast- Feeding Assessment  Attends Breast-Feeding Classes: No  Breast-Feeding Experience: Yes             Discussed with mother her plan for feeding. Reviewed the benefits of exclusive breast milk feeding during the hospital stay. Informed her of the risks of using formula to supplement in the first few days of life as well as the benefits of successful breast milk feeding; referred her to the Breastfeeding booklet about this information. She acknowledges understanding of information reviewed and states that it is her plan to both breast and formula feed her infant. Will support her choice and offer additional information as needed.

## 2020-01-22 NOTE — LACTATION NOTE
This note was copied from a baby's chart. Mom states wants to give formula for now. Doesn't feel up to breast feeding. Mom's choice supported.

## 2020-01-22 NOTE — PROGRESS NOTES
1925: Bedside and Verbal shift change report given to JULY Leo RN (oncoming nurse) by MARRY Shah RN (offgoing nurse). Report included the following information SBAR, Kardex, OR Summary, Procedure Summary, Intake/Output, MAR and Recent Results. 2044: Resident at bedside assessing pt and discussing infant condition with pt    2135:TRANSFER - OUT REPORT:    Verbal report given to MARRY Delatorre RN on [address]  being transferred to [facility] for routine progression of care       Report consisted of patients Situation, Background, Assessment and   Recommendations(SBAR). Information from the following report(s) SBAR, Kardex, OR Summary, Procedure Summary, Intake/Output, MAR and Recent Results was reviewed with the receiving nurse. Lines:   Peripheral IV 01/20/20 Right Forearm (Active)   Site Assessment Clean, dry, & intact 1/21/2020  9:03 PM   Phlebitis Assessment 0 1/21/2020  9:03 PM   Infiltration Assessment 0 1/21/2020  9:03 PM   Dressing Status Clean, dry, & intact 1/21/2020  9:03 PM   Dressing Type Tape;Transparent 1/21/2020  9:03 PM   Hub Color/Line Status Pink 1/21/2020  2:15 PM   Alcohol Cap Used Yes 1/21/2020  1:16 PM        Opportunity for questions and clarification was provided.       Patient transported with:   Registered Nurse

## 2020-01-22 NOTE — ROUTINE PROCESS
Bedside and Verbal shift change report given to Teressa Skelton (oncoming nurse) by Virgie Tripp. Ellen Cheung (offgoing nurse). Report given with SBAR, Kardex, Intake/Output and MAR.

## 2020-01-22 NOTE — PROGRESS NOTES
Post-Operative Day Number 1 Progress Note    Patient doing well post-op day 1 from  delivery without significant complaints. Pain well controlled. Lochia minimal.  Tolerating regular diet. Ambulating. Camejo out. No flatus. No BM. Pt complains of feeling bloated and not passing gas. Vitals:    Patient Vitals for the past 8 hrs:   BP Temp Pulse Resp   20 0839 103/58      20 0830 (!) 83/53 98 °F (36.7 °C) 76 16   20 0309 93/52 97.6 °F (36.4 °C) 69 16     Temp (24hrs), Av.3 °F (36.8 °C), Min:97.6 °F (36.4 °C), Max:99.1 °F (37.3 °C)      Vital signs stable, afebrile. Intake and Output:     Date 20 0700 - 20 0659   Shift 5291-1968 5510-1370 1138-2576 24 Hour Total   INTAKE   Shift Total(mL/kg)       OUTPUT   Urine(mL/kg/hr) 300   300   Shift Total(mL/kg) 300(3)   300(3)   Weight (kg) 100.2 100.2 100.2 100.2       Exam:   Patient without distress               CTAB, no w/r/r/c    RRR, + S1 and S2, no m/r/g    Abdomen soft, fundus firm and at the umbilicus, non tender                Incision covered with dressing which is c/d/i, appropriately tender               Lower extremities negative for swelling, no cords or tenderness, SCDs in place    Lab/Data Review:  Recent Results (from the past 12 hour(s))   CBC WITH AUTOMATED DIFF    Collection Time: 20  3:08 AM   Result Value Ref Range    WBC 18.2 (H) 3.6 - 11.0 K/uL    RBC 3.81 3.80 - 5.20 M/uL    HGB 10.9 (L) 11.5 - 16.0 g/dL    HCT 32.7 (L) 35.0 - 47.0 %    MCV 85.8 80.0 - 99.0 FL    MCH 28.6 26.0 - 34.0 PG    MCHC 33.3 30.0 - 36.5 g/dL    RDW 16.7 (H) 11.5 - 14.5 %    PLATELET 985 466 - 035 K/uL    NRBC 0.0 0  WBC    ABSOLUTE NRBC 0.00 0.00 - 0.01 K/uL    NEUTROPHILS 77 (H) 32 - 75 %    LYMPHOCYTES 14 12 - 49 %    MONOCYTES 7 5 - 13 %    EOSINOPHILS 2 0 - 7 %    BASOPHILS 0 0 - 1 %    IMMATURE GRANULOCYTES 1 (H) 0.0 - 0.5 %    ABS. NEUTROPHILS 13.9 (H) 1.8 - 8.0 K/UL    ABS. LYMPHOCYTES 2.5 0.8 - 3.5 K/UL    ABS. MONOCYTES 1.4 (H) 0.0 - 1.0 K/UL    ABS. EOSINOPHILS 0.3 0.0 - 0.4 K/UL    ABS. BASOPHILS 0.0 0.0 - 0.1 K/UL    ABS. IMM. GRANS. 0.1 (H) 0.00 - 0.04 K/UL    DF AUTOMATED           Assessment and Plan:    Ms. Ho Raymond is a [id number] EC 37w1d POD1 from stat C/S for prolapsed cord following IOL for cholestasis of pregnancy. Prenatal course was complicated by LAY (requiring Fe infusions), AMA, hx of GDM, HSV, morbid obesity, recurrent UTIs and polyhydramnios. Patient appears to be having uncomplicated post- course. 1. Continue routine post-op care and maternal education. 2. Incision bandage may be removed 24 hours post-op. 3. Simethicone for gas, continue colace      Prenatal course  SIUP: RH pos, s/p flu and tdap, GBS pos. EFW 40% on 20.      Intrahepatic cholestasis of Pregnancy: Was on ursodiol in pregnancy     LAY: POA Hgb 12.9. Hgb today 10.9. Followed by H/O, received x2 iron infusions, last one 10/25, on iron BID+bowel regimen.      Recurrent UTI's: Last culture 1/10-E coli. Most recent Keflex 500mg QID starting 20   -Continue Kelfex 500QID until 20 @ 1200 for 7 day course     Hx GDM: 1' GTT close to 200 and 3' very borderline. 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 Treated with 7 day acyclovir. HSV I/II IgG positive but IgM negative. On ppx from week 36w-->37w. No lesions appreciated upon arrival     Polyhydramnios: MYRA now 25, max this pregnancy 32. Patient seen with Dr. Rachel Wren.     Frankey Haw, MD  Family Medicine Resident

## 2020-01-23 VITALS
BODY MASS INDEX: 41.72 KG/M2 | TEMPERATURE: 98.2 F | RESPIRATION RATE: 18 BRPM | HEART RATE: 78 BPM | OXYGEN SATURATION: 95 % | DIASTOLIC BLOOD PRESSURE: 62 MMHG | WEIGHT: 221 LBS | HEIGHT: 61 IN | SYSTOLIC BLOOD PRESSURE: 96 MMHG

## 2020-01-23 PROCEDURE — 74011250637 HC RX REV CODE- 250/637: Performed by: STUDENT IN AN ORGANIZED HEALTH CARE EDUCATION/TRAINING PROGRAM

## 2020-01-23 PROCEDURE — 77030036554

## 2020-01-23 PROCEDURE — 74011250637 HC RX REV CODE- 250/637: Performed by: OBSTETRICS & GYNECOLOGY

## 2020-01-23 RX ORDER — CEPHALEXIN 500 MG/1
500 CAPSULE ORAL EVERY 6 HOURS
Qty: 4 CAP | Refills: 0 | Status: SHIPPED | OUTPATIENT
Start: 2020-01-23

## 2020-01-23 RX ORDER — DOCUSATE SODIUM 100 MG/1
100 CAPSULE, LIQUID FILLED ORAL 2 TIMES DAILY
Qty: 60 CAP | Refills: 2 | Status: SHIPPED | OUTPATIENT
Start: 2020-01-23 | End: 2020-04-22

## 2020-01-23 RX ORDER — IBUPROFEN 800 MG/1
800 TABLET ORAL EVERY 8 HOURS
Qty: 90 TAB | Refills: 0 | Status: SHIPPED | OUTPATIENT
Start: 2020-01-23

## 2020-01-23 RX ORDER — OXYCODONE AND ACETAMINOPHEN 5; 325 MG/1; MG/1
1 TABLET ORAL
Qty: 20 TAB | Refills: 0 | Status: SHIPPED | OUTPATIENT
Start: 2020-01-23 | End: 2020-01-30

## 2020-01-23 RX ADMIN — CEPHALEXIN 500 MG: 250 CAPSULE ORAL at 06:02

## 2020-01-23 RX ADMIN — OXYCODONE HYDROCHLORIDE AND ACETAMINOPHEN 2 TABLET: 5; 325 TABLET ORAL at 12:03

## 2020-01-23 RX ADMIN — OXYCODONE HYDROCHLORIDE AND ACETAMINOPHEN 2 TABLET: 5; 325 TABLET ORAL at 04:06

## 2020-01-23 RX ADMIN — DOCUSATE SODIUM 100 MG: 100 CAPSULE, LIQUID FILLED ORAL at 08:04

## 2020-01-23 RX ADMIN — IBUPROFEN 800 MG: 800 TABLET ORAL at 12:03

## 2020-01-23 RX ADMIN — CEPHALEXIN 500 MG: 250 CAPSULE ORAL at 14:05

## 2020-01-23 RX ADMIN — IBUPROFEN 800 MG: 800 TABLET ORAL at 04:06

## 2020-01-23 RX ADMIN — OXYCODONE HYDROCHLORIDE AND ACETAMINOPHEN 2 TABLET: 5; 325 TABLET ORAL at 08:04

## 2020-01-23 NOTE — DISCHARGE INSTRUCTIONS
POST DELIVERY DISCHARGE INSTRUCTIONS    Name: Henrique Adams  YOB: 1985  Primary Diagnosis: Active Problems:    Labor and delivery indication for care or intervention (2020)      FOLLOW UP APPOINTMENT WITH DR. ADIEL AMBROCIO ON 2020 AT 1 PM    General:     Diet/Diet Restrictions:  Eight 8-ounce glasses of fluid daily (water, juices); avoid excessive caffeine intake. Meals/snacks as desired which are high in fiber and carbohydrates and low in fat and cholesterol. Physical Activity / Restrictions / Safety:     Avoid heavy lifting, no more that 8 lbs. For 2-3 weeks; limit use of stairs to 2 times daily for the first week home. No driving for one week. Avoid intercourse 4-6 weeks, no douching or tampon use. Check with obstetrician before starting or resuming an exercise program.         Discharge Instructions/Special Treatment/Home Care Needs:     Continue prenatal vitamins. Continue to use squirt bottle with warm water on your episiotomy after each bathroom use until bleeding stops. If steri-strips applied to your incision, remove in 7-10 days. Call your doctor for the following:     Fever over 101 degrees by mouth. Vaginal bleeding heavier than a normal menstrual period or clot larger than a golf ball. Red streaks or increased swelling of legs, painful red streaks on your breast.  Painful urination, constipation and increased pain or swelling or discharge with your incision. If you feel extremely anxious or overwhelmed. If you have thoughts of harming yourself and/or your baby. Pain Management:     Pain Management:   Take Acetaminophen (Tylenol) or Ibuprofen (Advil, Motrin), as directed for pain. Use a warm Sitz bath 3 times daily to relieve episiotomy or hemorrhoidal discomfort. Heating pad to  incision as needed. For hemorrhoidal discomfort, use Tucks and Anusol cream as needed and directed. Follow-Up Care:      These are general instructions for a healthy lifestyle:    No smoking/ No tobacco products/ Avoid exposure to second hand smoke    Surgeon General's Warning:  Quitting smoking now greatly reduces serious risk to your health. Obesity, smoking, and sedentary lifestyle greatly increases your risk for illness    A healthy diet, regular physical exercise & weight monitoring are important for maintaining a healthy lifestyle    Recognize signs and symptoms of STROKE:    F-face looks uneven    A-arms unable to move or move unevenly    S-speech slurred or non-existent    T-time-call 911 as soon as signs and symptoms begin-DO NOT go       Back to bed or wait to see if you get better-TIME IS BRAIN. Después del parto (período de posparto): Instrucciones de cuidado - [ After Your Delivery (the Postpartum Period): Care Instructions ]  Instrucciones de cuidado    Felicidades por el nacimiento de estevez bebé. Al igual que el Leann, el tiempo con el recién nacido puede ser un momento de [place], Michael [place] y agotamiento. Es posible que se sienta wiley al mirar la bal de estevez pequeño bebé. También podría sentirse abrumada por estevez nuevo ritmo de sueño y las nuevas responsabilidades. Al principio, los bebés suelen dormir robel el día y permanecen despiertos robel la noche. No tienen ningún patrón ni rutina. Podrían alex gritos ahogados, sacudirse y despertarse, o parecer aleksandar si tuvieran los ojos cruzados (bizcos). Todo esto es normal, e incluso la pueden hacer sonreír. Robel las primeras semanas siguientes al parto, trate de cuidarse roxanna. Podría tardar de 4 a 6 semanas en volver a sentirse usted misma, y posiblemente más tiempo si le mendez hecho kita cesárea. Es probable que se sienta muy fatigada robel varias semanas. [place] días estarán llenos de Virg[place], raquel también de mucha alegría. La atención de seguimiento es kita parte clave de estevez tratamiento y seguridad.  Asegúrese de hacer y acudir a todas las citas, y llame a estevez médico si está teniendo problemas. También es kita buena idea saber los resultados de mike exámenes y mantener kita lista de los medicamentos que maria elena. ¿Cómo puede cuidarse en el hogar? Cuide estevez cuerpo después del parto  · Utilice toallas sanitarias en vez de tampones para las pérdidas de mata que podrían durar hasta 2 semanas. · Alivie los cólicos con ibuprofeno (Advil, Motrin). · Alivie el dolor de las hemorroides y la koko entre la vagina y el recto con compresas de hielo o de infusión de hamamelis Delcatherine Griffes (\"witch hazel\"). · Alivie el estreñimiento bebiendo mucho líquido y comiendo alimentos ricos en fibra. Pregúntele a estevez médico acerca de los ablandadores de heces de [place]. · Límpiese con un chorrito suave de agua tibia de kita botella en vez de hacerlo con papel higiénico.  · [place] un baño de asiento en agua tibia varias veces al día. · Use un buen sostén de lactancia. Alivie el dolor y la hinchazón de los senos con toallitas de aseo húmedas tibias. · Si no está amamantando, utilice hielo en lugar de calor para aliviar el dolor de los senos. · Si está amamantando, estevez período menstrual podría no comenzar hasta después de varios meses. Es posible que, al principio, mata más y [place] de lo que lo hacía antes del [facility]. · Espere hasta que haya sanado (de 4 a 6 semanas) antes de volver a tener relaciones sexuales. Estevez médico le dirá cuándo puede tener relaciones sexuales. · Trate de no viajar con el bebé robel las primeras 5 o 6 semanas. Si hace un viaje kia en automóvil, juaquin paradas frecuentes para caminar y estirarse. Evite el agotamiento  · Descanse todos los días. Trate de dormir la siesta cuando estevez bebé también lo juaquin. · Pídale a otro adulto que la acompañe por unos nabila después del [place]. · Planifique el cuidado de los niños si tiene otros hijos. · Sea flexible para que pueda comer a horas fuera de lo común y dormir cuando lo necesite.  Tanto usted aleksandar estevez bebé están creando horarios nuevos. · Planee pequeñas salidas para estar fuera de casa. El cambio podría hacer que se sienta menos fatigada. · Pida ayuda para cocinar y [address] hogar y las compras. Recuerde que estevez principal tarea consiste en cuidar a estevez bebé. Sepa qué ayuda puede recibir en tami de tener depresión posparto  · La depresión posparto es común robel las primeras 1 o 2 semanas siguientes al parto. Podría llorar o sentirse talat o irritable sin razón alguna. · Descanse cada vez que pueda hacerlo. Estar fatigada dificulta manejar las emociones. · Salga a caminar con estevez bebé. · Hable con estevez luis, mike amigos y mike familiares acerca de mike sentimientos. · Si mike síntomas baxter más de unas pocas semanas, o si se siente muy deprimida, pídale ayuda a estevez médico.  · La depresión posparto puede tratarse. Los grupos de apoyo y la asesoría psicológica pueden ser Macario Kleine. A veces, los medicamentos también pueden ayudar. Manténgase saludable  · Coma alimentos saludables para tener más energía y adelgazar las libras adicionales que engordó con el bebé. · Si amamanta, evite las drogas. Si dejó de fumar robel el embarazo, trate de no volver a fumar. Si opta por oksana kita bebida alcohólica de vez en cuando, solo tome kita bebida y limite la cantidad de ocasiones en que camille alcohol. Después de beber alcohol, espere al menos 2 horas antes de amamantar para reducir la cantidad de alcohol que el bebé pueda recibir a través de la [place]. · Comience a hacer ejercicios diarios después de 4 a 6 semanas, raquel descanse cuando se sienta fatigada. · Aprenda ejercicios para tonificar el abdomen. Moisés ejercicios de Keg[place] para recuperar la fuerza de los músculos pélvicos. Puede hacer los ejercicios de Keg[place] mientras está de pie o sentada. ? Apriete los mismos músculos que usted usaría para detener la [place]. El abdomen y los muslos no deben moverse.   ? Manténgalos apretados robel 3 segundos y, Meg Talavera, relájelos otros 3 segundos. ? Empiece con 3 segundos. Patricarturo De Oliveira, añada 1 vijay cada semana hasta que sea capaz de apretar robel 10 segundos. ? Repita el ejercicio entre 10 y 13 veces [address] Moisés luz o más sesiones cada día. · Busque kita clase para nuevas madres y recién nacidos que tenga un tiempo de ejercicio. · Si le mendez hecho kita cesárea, dese un poco más de tiempo antes de hacer ejercicio, y tenga cuidado. ¿Cuándo debe pedir ayuda? Llame al 911 en cualquier momento que considere que necesita atención de [place]. Por ejemplo, llame si:    · Tiene pensamientos de lastimarse o de hacerle daño a diaz bebé o a otra persona.     · Se desmayó (perdió el conocimiento).     · Tiene dolor en el pecho, le falta el aire o tose mata.     · Tiene convulsiones.    Llame a diaz médico ahora mismo o busque atención médica de inmediato si:    · Tiene sangrado vaginal intenso. [place] significa que está expulsando coágulos sanguíneos y empapando kita toalla sanitaria cada hora por 2 horas o más.     · Está mareada o aturdida, o siente aleksandar que se puede desmayar.     · Tiene fiebre.     · Tiene dolor abdominal nuevo o más intenso.     · Tiene señales de un coágulo de mata en la pierna (que se llama trombosis venosa profunda), aleksandar:  ? Dolor en la pantorrilla, el muslo, la joya o detrás de la rodilla. ? Enrojecimiento e hinchazón en la pierna o la joya.     · Tiene señales de preeclampsia, aleksandar:  ? Hinchazón repentina de la carolyn, las stella o los pies. ? Nuevos problemas de la vista (aleksandar oscurecimiento, neo borroso o neo puntos). ? Dolor de roshan intenso.    Preste especial atención a los cambios en diaz keerthi y asegúrese de comunicarse con diaz médico si:    · Diaz sangrado vaginal parece volverse más intenso.     · Tiene flujo vaginal nuevo o que empeora.     · Se siente talat, ansiosa o sin esperanzas robel más de unos pocos días.     · No mejora aleksandar se esperaba. ¿Dónde puede encontrar más información en inglés?   [place] a http://pj-nasima.info/. Tiesha SOW en la búsqueda para aprender más acerca de \"Después del parto (período de posparto): Instrucciones de cuidado - [ After Your Delivery (the Postpartum Period): Care Instructions ]. \"  Revisado: 29 mayo, 2019  Versión del contenido: 12.2  © 0665-8045 Healthwise, Incorporated. Las instrucciones de cuidado fueron adaptadas bajo licencia por [employer] (which disclaims liability or warranty for this information). Si usted tiene [place] afección médica o sobre estas instrucciones, siempre pregunte a estevez profesional de keerthi. Healthwise, Incorporated niega toda garantía o responsabilidad por estevez uso de esta información.

## 2020-01-23 NOTE — PROGRESS NOTES
Discharge instructions reviewed and signed. Patient acknowledges understanding. Prescriptions given and paperwork signed.   OB follow-up on February 5 at 1pm.

## 2020-01-23 NOTE — ROUTINE PROCESS
Bedside shift change report given to [address] [place] [address] Avenue (oncoming nurse) by Sena Guan RN (offgoing nurse). Report included the following information SBAR, Kardex and MAR.

## 2020-01-23 NOTE — PROGRESS NOTES
[employer]          Provided  services to Hema Hernandez during discharge instructions. MOB had the opportunity to ask questions and all questions were answered.                   Paola Castro [facility] Certified    can be requested at: [email or web address]

## 2020-01-23 NOTE — PROGRESS NOTES
[address] Medicine Residency Attending Addendum:    I was NOT present with the resident during the interview & examination of the patient. I personally interviewed the patient & repeated the critical or key portions of the exam.  I agree with the resident's note with the following additions:    Hx: Doing well. Lochia normal.  Pain controlled. No dizziness. Breastfeeding. Exam:   BP Readings from Last 3 Encounters:   20 95/50   20 98/62   01/10/20 100/59      GEN: NAD, lying in bed comfortably  ABD: Soft, NT, UFF  EXT: no calf TTP, no erythema or palpable cords    No results found for this or any previous visit (from the past 24 hour(s)). Assessment/Plan:  35yo  POD2 s/p PLTCS 2/2 cord prolapse after IOL for IHCP at 37 wk   1. POD2: Meeting milestones, continue routine postpartum care, encourage ambulation and direct breastfeeding  2. IHCP: LFTs were never elevated   3. UTI: treating through tomorrow   4.   HSV: was on ppx     Berna 83, DO 20

## 2020-01-23 NOTE — DISCHARGE SUMMARY
Obstetrical Discharge Summary     Name: Scot Dhillon MRN: [id number]  SSN: [id number]    YOB: 1985  Age: 27 y.o. Sex: female      Admit Date: 2020    Discharge Date: 2020     Admitting Physician: Swetha Solano DO     Attending Physician:  Malissa Aggarwal DO     Admission Diagnoses: Labor and delivery indication for care or intervention [O75.9]    Discharge Diagnoses:   Information for the patient's :  Kevin Ellsworth, Female Evangelina Gillespie [[id number]]   Delivery of a 6 lb 5.9 oz (2.89 kg) female infant via , Low Transverse on 2020 at 12:24 PM  by Andreia Harrison. Apgars were 9  and 9 . Additional Diagnoses:   Hospital Problems  Date Reviewed: 2020          Codes Class Noted POA    Labor and delivery indication for care or intervention ICD-10-CM: O75.9  ICD-9-CM: 659.90  2020 Unknown             Lab Results   Component Value Date/Time    Rubella, External immune 2019    GrBStrep, External POSITIVE 01/10/2020       Immunization(s):   Immunization History   Administered Date(s) Administered    Influenza Vaccine (Quad) PF 10/11/2019    Tdap 2019        Hospital Course:   Thomas Ailyn [id number] LA [id number] POD1 from stat  section for prolapsed cord following induction of labor for cholestasis of pregnancy. Prenatal course was complicated by LAY (requiring Fe infusions), AMA (on asa), hx of GDM, HSV, morbid obesity, recurrent UTIs and polyhydramnios. Delivered TLFI without complications. Normal hospital course following the delivery. On day of discharge patient reported minimal lochia, well controlled pain, and no other complaints. Discharged with pain regimen and bowel regimen. Advised to continue prenatal vitamins. Follow up with OB/PCP in 2 weeks. Prenatal course  SIUP: RH pos, s/p flu and tdap, GBS pos. EFW 40% on 20.      Intrahepatic cholestasis of Pregnancy: Was on ursodiol in pregnancy.  Stopped postpartum.     LAY: POA Hgb 12.9. Hgb on discharge. Followed by H/O for x2 iron infusions, last one 10/25. Stopped oral iron postpartum for normal Hgb.       Recurrent UTI's: Last culture 1/10-E coli. Most recent Keflex 500mg QID starting 20   -Continue Kelfex 500QID until 20 @ 1200 for 7 day course     Hx GDM: 1' GTT close to 200 and 3' very borderline. Diet controlled this pregnancy, normal ranges during labor.     AMA: On ASA this pregnancy. Stopped postpartum     HSV I/II: Lesions noted 19, culture positive for HSV2. Treated with 7 day acyclovir. HSV I/II IgG positive but IgM negative. On ppx from week 36w-->37w. No lesions appreciated upon arrival.     Polyhydramnios: MRYA now 25 at presentation, max this pregnancy 32.     Condition at Discharge:  Hemodynamically stable  Disposition: Discharge to Home    Physical exam:  Visit Vitals  BP 95/50   Pulse 65   Temp 98 °F (36.7 °C)   Resp 17   Ht 5' 1\" (1.549 m)   Wt 221 lb (100.2 kg)   LMP 2019   SpO2 95%   Breastfeeding Unknown   BMI 41.76 kg/m²       Exam:  Patient without distress. CTAB, no w/r/r/c               RRR, + S1 and S2, no m/r/g               Abdomen soft, fundus firm at level of umbilicus, non tender               Perineum with normal lochia noted. Lower extremities are negative for swelling, cords or tenderness. Patient Instructions:   Current Discharge Medication List      START taking these medications    Details   docusate sodium (COLACE) 100 mg capsule Take 1 Cap by mouth two (2) times a day for 90 days. Qty: 60 Cap, Refills: 2      ibuprofen (MOTRIN) 800 mg tablet Take 1 Tab by mouth every eight (8) hours. Qty: 90 Tab, Refills: 0      oxyCODONE-acetaminophen (PERCOCET) 5-325 mg per tablet Take 1 Tab by mouth every six (6) hours as needed for Pain for up to 20 doses. Max Daily Amount: 4 Tabs.   Qty: 20 Tab, Refills: 0    Associated Diagnoses: Delivery of pregnancy by  section CONTINUE these medications which have CHANGED    Details   cephALEXin (KEFLEX) 500 mg capsule Take 1 Cap by mouth every six (6) hours. Qty: 4 Cap, Refills: 0         CONTINUE these medications which have NOT CHANGED    Details   prenatal vit-calcium-iron-fa (PRENATAL PLUS WITH CALCIUM) 27 mg iron- 1 mg tab Take 1 Tab by mouth daily. Qty: 30 Tab, Refills: 3         STOP taking these medications       acyclovir (ZOVIRAX) 400 mg tablet Comments:   Reason for Stopping:         ursodiol (ACTIGALL) 250 mg tablet Comments:   Reason for Stopping:         ferrous sulfate 325 mg (65 mg iron) tablet Comments:   Reason for Stopping:         aspirin 81 mg chewable tablet Comments:   Reason for Stopping:                 Reference my discharge instructions.     Follow-up Information     Follow up With Specialties Details Why Contact Halima Snowden DO Family Practice On 2/5/2020 Please attend your postpartum follow up appointment with Dr. Mamadou Garber on 2/5/20 [address] [facility] [address]  8 [facility], [address]   [address] [facility] 33  [phone]              Signed By:  Maki Luke MD    Family Medicine Resident

## 2020-01-23 NOTE — LACTATION NOTE
This note was copied from a baby's chart. mother giving both breast and formula. Puts baby to breast but baby very fussy and nursed only for a few minutes, cring on and off. Gave formula and baby quited down. Discused importance of colostrum and supply and demand. Pt will successfully establish breastfeeding by feeding in response to early feeding cues   or wake every 3h, will obtain deep latch, and will keep log of feedings/output. Taught to BF at hunger cues and or q 2-3 hrs and to offer 10-20 drops of hand expressed colostrum at any non-feeds. Breast Assessment  Left Breast: Medium, Large  Left Nipple: Everted, Intact, Large  Right Breast: Medium, Large  Right Nipple: Everted, Large, Intact  Breast- Feeding Assessment  Attends Breast-Feeding Classes: No  Breast-Feeding Experience: Yes  Type/Quality: Good  Lactation Consultant Visits  Breast-Feedings: Good   Mother/Infant Observation  Mother Observation: Alignment, Breast comfortable, Close hold, Holds breast, Lets baby end feeding, Nipple round on release, Recognizes feeding cues  Infant Observation: Breast tissue moves, Opens mouth, Lips flanged, upper, Lips flanged, lower, Latches nipple and aereolae(a few suckles)  LATCH Documentation  Latch: Grasps breast, tongue down, lips flanged, rhythmic sucking  Audible Swallowing: Spontaneous and intermittent (24 hours old)  Type of Nipple: Everted (after stimulation)  Comfort (Breast/Nipple): Soft/non-tender  Hold (Positioning): Full assist, teach one side, mother does other, staff holds  LATCH Score: 9    Breast Feeding Discharge Information    Chart shows numerous feedings, void, stool WNL. Discussed Importance of monitoring outputs and feedings on first week of  Breastfeeding.   Discussed ways to tell if baby getting enough, ie  Voids and stools, by day 7, baby should have at least  4-6 wet diapers a day, change in color of stool to a seedy yellow, and return to birth wt within 2 weeks with a steady increase after that. .  Follow up with pediatrician visit for weight check in 1-2 days reviewed. Discussed Breast feeding support groups and encouraged to call Warm line number, [phone]  for any breast feeding questions or problems that arise. Please leave a message and let us know what is going on. We will return your call within 24 hours. Breast feeding Support groups meet at [facility] the first and third Wednesday of the month from 11-12 noon. Meetings are held in a classroom past the cafeteria on the first floor. Feedings  Encouraged mom to attempt feeding with baby led feeding cues. Just as sucking on fingers, rooting, mouthing. Looking for 8-12 feedings in 24 hours. Don't limit baby at breast, allow baby to come off breast on it's own. Baby may want to feed  often and may increase number of feedings on second day of life. Skin to skin encouraged. In 4-6 weeks, baby may go though a growth spurt and increase feedings for several days to increase your milk supply. If baby doesn't nurse,  Mom should Pump or hand express drops, 12-18 drops, and give infant any expressed milk. If not pumping any milk, mom should contact pediatrician for possible need for supplementation. MOM's DIET    Discussed eating a healthy diet. Instructed mother to eat a variety of foods in order to get a well balanced diet. She should consume an extra 300-500 calories per day (more than her non-pregnant requirement.) These extra calories will help provide energy needed for optimal breast milk production. Mother also encouraged to \"drink to thirst\" and it is recommended that she drink fluids such as water and fruit/vegetable juice. Nutritious snacks should be available so that she can eat throughout the day to help satisfy her hunger and maintain a good milk supply. Continue taking your Prenatal vitamins as long as you breast feed. Engorgement Care Guidelines:  Anticipatory guidance shared.     If breast become engorged, to help decrease engorgement. Frequent breastfeeding encouraged, cool packs around breast after nursing may help. May take motrin or Ibuprofen as ordered by your Doctor. Call your doctor, midwife and/or lactation consultant if:   Riya Vergara is having no wet or dirty diapers    Baby has dark colored urine after day 3  (should be pale yellow to clear)    Baby has dark colored stools after day 4  (should be mustard yellow, with no meconium)    Baby has fewer wet/soiled diapers or nurses less   frequently than the goals listed here    Mom has symptoms of mastitis   (sore breast with fever, chills, flu-like aching)    Information discussed with mom in [address] [place] [address]

## 2020-02-19 ENCOUNTER — ROUTINE PRENATAL (OUTPATIENT)
Dept: FAMILY MEDICINE CLINIC | Age: 35
End: 2020-02-19

## 2020-02-19 VITALS
WEIGHT: 201 LBS | OXYGEN SATURATION: 96 % | RESPIRATION RATE: 16 BRPM | HEIGHT: 61 IN | TEMPERATURE: 98.3 F | SYSTOLIC BLOOD PRESSURE: 89 MMHG | HEART RATE: 59 BPM | DIASTOLIC BLOOD PRESSURE: 52 MMHG | BODY MASS INDEX: 37.95 KG/M2

## 2020-02-19 DIAGNOSIS — D50.9 IRON DEFICIENCY ANEMIA DURING PREGNANCY: ICD-10-CM

## 2020-02-19 DIAGNOSIS — O99.019 IRON DEFICIENCY ANEMIA DURING PREGNANCY: ICD-10-CM

## 2020-02-19 NOTE — PROGRESS NOTES
Postpartum Note    27 y.o. female status post LTCS presenting for postpartum visit. Patient doing well post-partum without significant complaint. Lochia: normal  Pain: controlled  Baby: doing well, has seen PCP  Sexual activity: not yet   Plan for contraception: undecided   Symptoms of depression: [place] 4   Breast/bottle: bottle   Support from FOB/family: FOB works a lot, patient taking care of  and her other children, not much help otherwise     Vitals:    Visit Vitals  BP (!) 89/52   Pulse (!) 59   Temp 98.3 °F (36.8 °C) (Oral)   Resp 16   Ht 5' 1\" (1.549 m)   Wt 201 lb (91.2 kg)   SpO2 96%   Breastfeeding No   BMI 37.98 kg/m²       Exam:  Patient without distress. Abdomen soft, fundus firm at level of umbilicus, nontender. Incision C/D/I               Lower extremities:  No edema. No palpable cords or tenderness. Assessment and Plan:    Lizz Harris is a 27 y.o.  s/p stat LTCS 2/2 prolapsed cord at 37w1d after induction for ICP. Patient having uncomplicated post-partum course. 1. PP: Continue routine care, call clinic or make appointment for symptoms of sadness, follow up for yearly well woman exam.    2. Hx LAY: BP low, asymptomatic so POC hbg checked and was 11.4. Hydrate. 3. HSV   4.  Hx GDM: encouraged yearly visits with PCP and regular screening for DM2                 Cyndi Gomez, DO

## 2020-02-19 NOTE — PROGRESS NOTES
Chief Complaint   Patient presents with   [address]     1. Have you been to the ER, urgent care clinic since your last visit? Hospitalized since your last visit? Yes [facility] for labor and delivery 01/20/2020.    2. Have you seen or consulted any other health care providers outside of the [address] [place] [address] since your last visit? Include any pap smears or colon screening.   No

## 2020-02-21 LAB — HGB BLD-MCNC: 11.4 G/DL

## 2020-02-25 ENCOUNTER — TELEPHONE (OUTPATIENT)
Dept: ONCOLOGY | Age: 35
End: 2020-02-25

## 2020-02-25 DIAGNOSIS — D50.9 IRON DEFICIENCY ANEMIA DURING PREGNANCY: Primary | ICD-10-CM

## 2020-02-25 DIAGNOSIS — O99.019 IRON DEFICIENCY ANEMIA DURING PREGNANCY: Primary | ICD-10-CM

## 2020-02-28 NOTE — PROGRESS NOTES
[id number] [facility], [place] [facility] at [address] [place] [address]  [phone]    Hematology / Oncology Consult    Reason for Visit:   Mena Nelson is a 27 y.o. female who is seen in consultation at the request of Dr. Molly Brambila for evaluation of anemia. History of Present Illness:   Mena Nelson is a 27 y.o. female who comes in for evaluation of anemia. She is Georgian speaking and was interviewed with assistance of a video . Patient is pregnant. She denies melena/hematochezia/hematuria. She denies heavy periods prior to pregnancy. She has had 3 prior pregnancies and did not have severe anemia at that time. No CP, but does endorse SOB with exertion. She endorses ice cravings, which started approx earlier this year. She states she is taking prenatal vitamins, but not taking iron supplements. She is also taking ASA and Vit C. Interval History: Today, patient is here for follow up of anemia. Patient is Bhutanese speaking. Used video . She had injectafer x 2 early 2019 and again Injectafer x 1 completed 20. She gave birth to a healthy infant in late 2020. She ended up requiring a  due to position of the fetus. She is taking oral iron supplements twice daily. Denies issues with constipation. Reports ongoing fatigue and HA. She had left leg numbness and pain during pregnancy which is now just numbness. No longer has ice cravings. Reports she is sleeping much better at night. Past Medical History:   Diagnosis Date    Anemia     Gestational diabetes     Herpes simplex virus (HSV) infection     Liver disease       No past surgical history on file. Social History     Tobacco Use    Smoking status: Never Smoker    Smokeless tobacco: Never Used   Substance Use Topics    Alcohol use: Not Currently      No family history on file.   Current Outpatient Medications   Medication Sig    cephALEXin (KEFLEX) 500 mg capsule Take 1 Cap by mouth every six (6) hours.  docusate sodium (COLACE) 100 mg capsule Take 1 Cap by mouth two (2) times a day for 90 days.  ibuprofen (MOTRIN) 800 mg tablet Take 1 Tab by mouth every eight (8) hours.  prenatal vit-calcium-iron-fa (PRENATAL PLUS WITH CALCIUM) 27 mg iron- 1 mg tab Take 1 Tab by mouth daily. No current facility-administered medications for this visit. No Known Allergies     Review of Systems: A complete review of systems was obtained, negative except as described above. Physical Exam:     There were no vitals taken for this visit. ECOG PS: 0  General: Well developed, no acute distress  Eyes: PERRLA, EOMI, anicteric sclerae  HENT: Atraumatic, OP clear, TMs intact without erythema  Neck: Supple  Lymphatic: No cervical, supraclavicular, axillary or inguinal adenopathy  Respiratory: CTAB, normal respiratory effort  CV: Normal rate, regular rhythm, no murmurs, no peripheral edema  GI: Gravid abdomen, Soft, nontender, nondistended, no masses, no hepatomegaly, no splenomegaly  MS: Normal gait and station. Digits without clubbing or cyanosis. Skin: No rashes, ecchymoses, or petechiae. Normal temperature, turgor, and texture. Neuro/Psych: Alert, oriented. 5/5 strength in all 4 extremities. Appropriate affect, normal judgment/insight. Results:     Lab Results   Component Value Date/Time    WBC 18.2 (H) 01/22/2020 03:08 AM    HGB 10.9 (L) 01/22/2020 03:08 AM    HCT 32.7 (L) 01/22/2020 03:08 AM    PLATELET 296 03/04/2525 03:08 AM    MCV 85.8 01/22/2020 03:08 AM    ABS.  NEUTROPHILS 13.9 (H) 01/22/2020 03:08 AM    Hemoglobin (POC) 11.4 02/21/2020 05:04 PM     Lab Results   Component Value Date/Time    Sodium 138 03/05/2020 10:59 AM    Potassium 4.0 03/05/2020 10:59 AM    Chloride 103 03/05/2020 10:59 AM    CO2 28 03/05/2020 10:59 AM    Glucose 86 03/05/2020 10:59 AM    BUN 10 03/05/2020 10:59 AM    Creatinine 0.57 03/05/2020 10:59 AM    GFR est AA >60 03/05/2020 10:59 AM    GFR est non-AA >60 03/05/2020 10:59 AM    Calcium 8.9 2020 10:59 AM    Glucose (POC) 84 2020 11:59 AM     Lab Results   Component Value Date/Time    Bilirubin, total 0.4 2020 10:59 AM    ALT (SGPT) 54 2020 10:59 AM    AST (SGOT) 11 (L) 2020 10:59 AM    Alk. phosphatase 154 (H) 2020 10:59 AM    Protein, total 7.8 2020 10:59 AM    Albumin 4.1 2020 10:59 AM    Globulin 3.7 2020 10:59 AM     Lab Results   Component Value Date/Time    Iron 91 2020 10:59 AM    TIBC 271 2020 10:59 AM    Iron % saturation 34 2020 10:59 AM    Ferritin 146 2020 10:59 AM       No results found for: B12LT, FOL, RBCF  No results found for: TSH, TSH2, TSH3, TSHP, TSHEXT, TSHEXT  Lab Results   Component Value Date/Time    Hep B surface Ag screen Negative 2019 11:22 AM         Imaging:     Radiology report(s) reviewed. Assessment & Plan:   Devon Durant is a 27 y.o. female who is pregnancy comes in for management of iron deficiency anemia. 1. Iron deficiency anemia: Anemia is mild (POC Hgb 11.4 in 2020) and iron levels are fine at this time. Current anemia may be partially due to other etiology. Pt required parenteral iron during pregnancy: Her anemia improved after Injectafer x 2 in 10/2019 and x 1 on 20. -- Ferrous sulfate 325mg 1-2 times a day with stool softeners prn.   -- Return in 12 weeks for repeat labs, MD visit. 2. Left lateral leg pain: Located in IT region, possibly due to bursitis or arthritis. I recommended changing sleeping position, but pt reluctant. 3. Obesity: Slow recovery after recent  and not walking much. -- Recommend starting on gradually increasing walking program.    4. HA: Mild to moderate. Not sleeping well due to  baby. Has been taking Tylenol. Okay to try Ibuprofen 200-400mg prn. Drink plenty of fluids. I appreciate the opportunity to participate in Ms. Evan Raymond's care.     Signed By: Angelika Johnson Marcus West MD     March 6, 2020

## 2020-03-05 ENCOUNTER — HOSPITAL ENCOUNTER (OUTPATIENT)
Dept: LAB | Age: 35
Discharge: HOME | End: 2020-03-05

## 2020-03-05 DIAGNOSIS — D50.9 IRON DEFICIENCY ANEMIA DURING PREGNANCY: ICD-10-CM

## 2020-03-05 DIAGNOSIS — O99.019 IRON DEFICIENCY ANEMIA DURING PREGNANCY: ICD-10-CM

## 2020-03-05 LAB
ALBUMIN SERPL-MCNC: 4.1 G/DL (ref 3.5–5)
ALBUMIN/GLOB SERPL: 1.1 {RATIO} (ref 1.1–2.2)
ALP SERPL-CCNC: 154 U/L (ref 45–117)
ALT SERPL-CCNC: 54 U/L (ref 12–78)
ANION GAP SERPL CALC-SCNC: 7 MMOL/L (ref 5–15)
AST SERPL-CCNC: 11 U/L (ref 15–37)
BILIRUB SERPL-MCNC: 0.4 MG/DL (ref 0.2–1)
BUN SERPL-MCNC: 10 MG/DL (ref 6–20)
BUN/CREAT SERPL: 18 (ref 12–20)
CALCIUM SERPL-MCNC: 8.9 MG/DL (ref 8.5–10.1)
CHLORIDE SERPL-SCNC: 103 MMOL/L (ref 97–108)
CO2 SERPL-SCNC: 28 MMOL/L (ref 21–32)
CREAT SERPL-MCNC: 0.57 MG/DL (ref 0.55–1.02)
FERRITIN SERPL-MCNC: 146 NG/ML (ref 26–388)
GLOBULIN SER CALC-MCNC: 3.7 G/DL (ref 2–4)
GLUCOSE SERPL-MCNC: 86 MG/DL (ref 65–100)
IRON SATN MFR SERPL: 34 % (ref 20–50)
IRON SERPL-MCNC: 91 UG/DL (ref 35–150)
POTASSIUM SERPL-SCNC: 4 MMOL/L (ref 3.5–5.1)
PROT SERPL-MCNC: 7.8 G/DL (ref 6.4–8.2)
SODIUM SERPL-SCNC: 138 MMOL/L (ref 136–145)
TIBC SERPL-MCNC: 271 UG/DL (ref 250–450)

## 2020-03-06 ENCOUNTER — OFFICE VISIT (OUTPATIENT)
Dept: ONCOLOGY | Age: 35
End: 2020-03-06

## 2020-03-06 ENCOUNTER — DOCUMENTATION ONLY (OUTPATIENT)
Dept: ONCOLOGY | Age: 35
End: 2020-03-06

## 2020-03-06 VITALS
WEIGHT: 202.8 LBS | BODY MASS INDEX: 38.29 KG/M2 | TEMPERATURE: 97.9 F | DIASTOLIC BLOOD PRESSURE: 68 MMHG | HEIGHT: 61 IN | HEART RATE: 87 BPM | OXYGEN SATURATION: 98 % | SYSTOLIC BLOOD PRESSURE: 105 MMHG

## 2020-03-06 DIAGNOSIS — O99.019 IRON DEFICIENCY ANEMIA DURING PREGNANCY: Primary | ICD-10-CM

## 2020-03-06 DIAGNOSIS — M79.605 LEG PAIN, LATERAL, LEFT: ICD-10-CM

## 2020-03-06 DIAGNOSIS — R51.9 NONINTRACTABLE HEADACHE, UNSPECIFIED CHRONICITY PATTERN, UNSPECIFIED HEADACHE TYPE: ICD-10-CM

## 2020-03-06 DIAGNOSIS — D50.9 IRON DEFICIENCY ANEMIA DURING PREGNANCY: Primary | ICD-10-CM

## 2020-03-06 DIAGNOSIS — E66.01 SEVERE OBESITY (HCC): ICD-10-CM

## 2020-03-06 NOTE — PROGRESS NOTES
[employer]  Social Work Navigator Encounter     Patient Name:  Chavez Buenrostro    Medical History: Iron deficiency anemia    Advance Directives: none on file; conversation deferred    Narrative: Patient here for follow-up with Dr. Meryl Gonsalez. Social work services requested by patient regarding FA rachel update. Met with patient. Cross Cultural present. Patient advised she submitted application in December, 2019. Explained application take 90 days to process and offered to follow-up on status. Noted patient applied for [employer] (Medicaid) in February, 2020. Patient is not eligible for full Medicaid. Applications take 45 days to process. Barriers to Care: uninsured    Assessment/Action:  1. Follow-up with patient regarding FA application.      Plan/Referral:   Financial/Medication assistance referral     Thank you,  Slade Guzman LCSW

## 2020-06-01 DIAGNOSIS — O99.019 IRON DEFICIENCY ANEMIA DURING PREGNANCY: ICD-10-CM

## 2020-06-01 DIAGNOSIS — D50.9 IRON DEFICIENCY ANEMIA DURING PREGNANCY: ICD-10-CM

## 2020-06-26 ENCOUNTER — OFFICE VISIT (OUTPATIENT)
Dept: ONCOLOGY | Age: 35
End: 2020-06-26

## 2020-06-26 ENCOUNTER — DOCUMENTATION ONLY (OUTPATIENT)
Dept: ONCOLOGY | Age: 35
End: 2020-06-26

## 2020-06-26 ENCOUNTER — HOSPITAL ENCOUNTER (OUTPATIENT)
Dept: LAB | Age: 35
Discharge: HOME | End: 2020-06-26

## 2020-06-26 VITALS
WEIGHT: 219.8 LBS | HEART RATE: 72 BPM | SYSTOLIC BLOOD PRESSURE: 100 MMHG | BODY MASS INDEX: 41.5 KG/M2 | DIASTOLIC BLOOD PRESSURE: 63 MMHG | TEMPERATURE: 98 F | HEIGHT: 61 IN | OXYGEN SATURATION: 97 %

## 2020-06-26 DIAGNOSIS — L65.9 HAIR THINNING: ICD-10-CM

## 2020-06-26 DIAGNOSIS — D50.9 IRON DEFICIENCY ANEMIA DURING PREGNANCY: ICD-10-CM

## 2020-06-26 DIAGNOSIS — E66.01 SEVERE OBESITY (HCC): ICD-10-CM

## 2020-06-26 DIAGNOSIS — R51.9 PERSISTENT HEADACHES: ICD-10-CM

## 2020-06-26 DIAGNOSIS — D50.9 IRON DEFICIENCY ANEMIA DURING PREGNANCY: Primary | ICD-10-CM

## 2020-06-26 DIAGNOSIS — O99.019 IRON DEFICIENCY ANEMIA DURING PREGNANCY: ICD-10-CM

## 2020-06-26 DIAGNOSIS — O99.019 IRON DEFICIENCY ANEMIA DURING PREGNANCY: Primary | ICD-10-CM

## 2020-06-26 LAB
ERYTHROCYTE [DISTWIDTH] IN BLOOD BY AUTOMATED COUNT: 12.6 % (ref 11.5–14.5)
FERRITIN SERPL-MCNC: 68 NG/ML (ref 26–388)
HCT VFR BLD AUTO: 42.5 % (ref 35–47)
HGB BLD-MCNC: 13.5 G/DL (ref 11.5–16)
IRON SATN MFR SERPL: 16 % (ref 20–50)
IRON SERPL-MCNC: 55 UG/DL (ref 35–150)
MCH RBC QN AUTO: 28.3 PG (ref 26–34)
MCHC RBC AUTO-ENTMCNC: 31.8 G/DL (ref 30–36.5)
MCV RBC AUTO: 89.1 FL (ref 80–99)
NRBC # BLD: 0 K/UL (ref 0–0.01)
NRBC BLD-RTO: 0 PER 100 WBC
PLATELET # BLD AUTO: 266 K/UL (ref 150–400)
PMV BLD AUTO: 12 FL (ref 8.9–12.9)
RBC # BLD AUTO: 4.77 M/UL (ref 3.8–5.2)
TIBC SERPL-MCNC: 349 UG/DL (ref 250–450)
WBC # BLD AUTO: 10.4 K/UL (ref 3.6–11)

## 2020-06-26 NOTE — PROGRESS NOTES
[facility]  Medical Oncology at [facility]  [phone]    Hematology / Oncology Established Visit    Reason for Visit:   Martin Urias is a 27 y.o. female who is seen for f/u of anemia. History of Present Illness:   Martin Urias is a 27 y.o. female who comes in for evaluation of anemia. She is Cypriot speaking and was interviewed with assistance of a video . Patient is pregnant. She denies melena/hematochezia/hematuria. She denies heavy periods prior to pregnancy. She has had 3 prior pregnancies and did not have severe anemia at that time. No CP, but does endorse SOB with exertion. She endorses ice cravings, which started approx earlier this year. She states she is taking prenatal vitamins, but not taking iron supplements. She is also taking ASA and Vit C. Interval History: Today, patient is here for follow up of anemia. Patient is Ghanaian speaking. Used video . She had injectafer x 2 early October 2019 and again Injectafer x 1 completed 12/18/20. She is no longer taking iron supplements. Reports ongoing fatigue. Reports hair is falling out. She continues to have headaches. She is taking Advil without relief of headaches. She is having headaches daily. She has not established care with a primary care provider due to lack of health insurance. She gained weight is not exercising. Past Medical History:   Diagnosis Date    Anemia     Gestational diabetes     Herpes simplex virus (HSV) infection     Liver disease       No past surgical history on file. Social History     Tobacco Use    Smoking status: Never Smoker    Smokeless tobacco: Never Used   Substance Use Topics    Alcohol use: Not Currently      No family history on file. Current Outpatient Medications   Medication Sig    cephALEXin (KEFLEX) 500 mg capsule Take 1 Cap by mouth every six (6) hours.  ibuprofen (MOTRIN) 800 mg tablet Take 1 Tab by mouth every eight (8) hours.  prenatal vit-calcium-iron-fa (PRENATAL PLUS WITH CALCIUM) 27 mg iron- 1 mg tab Take 1 Tab by mouth daily. No current facility-administered medications for this visit. No Known Allergies     Review of Systems: A complete review of systems was obtained, negative except as described above. Physical Exam:     Visit Vitals  /63 (BP 1 Location: Left arm, BP Patient Position: Sitting)   Pulse 72   Temp 98 °F (36.7 °C) (Oral)   Ht 5' 1\" (1.549 m)   Wt 219 lb 12.8 oz (99.7 kg)   SpO2 97%   BMI 41.53 kg/m²     ECOG PS: 0  General: Well developed, no acute distress  Eyes: PERRLA, EOMI, anicteric sclerae  HENT: Atraumatic, OP clear, TMs intact without erythema  Neck: Supple  Lymphatic: No cervical, supraclavicular, axillary or inguinal adenopathy  Respiratory: CTAB, normal respiratory effort  CV: Normal rate, regular rhythm, no murmurs, no peripheral edema  GI: Soft, nontender, nondistended, no masses, no hepatomegaly, no splenomegaly  MS: Normal gait and station. Digits without clubbing or cyanosis. Skin: No rashes, ecchymoses, or petechiae. Normal temperature, turgor, and texture. Neuro/Psych: Alert, oriented. 5/5 strength in all 4 extremities. Appropriate affect, normal judgment/insight. Results:     Lab Results   Component Value Date/Time    WBC 10.4 06/26/2020 02:18 PM    HGB 13.5 06/26/2020 02:18 PM    HCT 42.5 06/26/2020 02:18 PM    PLATELET 466 55/96/3201 02:18 PM    MCV 89.1 06/26/2020 02:18 PM    ABS.  NEUTROPHILS 13.9 (H) 01/22/2020 03:08 AM    Hemoglobin (POC) 11.4 02/21/2020 05:04 PM     Lab Results   Component Value Date/Time    Sodium 138 03/05/2020 10:59 AM    Potassium 4.0 03/05/2020 10:59 AM    Chloride 103 03/05/2020 10:59 AM    CO2 28 03/05/2020 10:59 AM    Glucose 86 03/05/2020 10:59 AM    BUN 10 03/05/2020 10:59 AM    Creatinine 0.57 03/05/2020 10:59 AM    GFR est AA >60 03/05/2020 10:59 AM    GFR est non-AA >60 03/05/2020 10:59 AM    Calcium 8.9 03/05/2020 10:59 AM    Glucose (POC) 84 2020 11:59 AM     Lab Results   Component Value Date/Time    Bilirubin, total 0.4 2020 10:59 AM    ALT (SGPT) 54 2020 10:59 AM    Alk. phosphatase 154 (H) 2020 10:59 AM    Protein, total 7.8 2020 10:59 AM    Albumin 4.1 2020 10:59 AM    Globulin 3.7 2020 10:59 AM     Lab Results   Component Value Date/Time    Iron 55 2020 02:18 PM    TIBC 349 2020 02:18 PM    Iron % saturation 16 (L) 2020 02:18 PM    Ferritin 68 2020 02:18 PM       No results found for: B12LT, FOL, RBCF  No results found for: TSH, TSH2, TSH3, TSHP, TSHEXT, TSHEXT  Lab Results   Component Value Date/Time    Hep B surface Ag screen Negative 2019 11:22 AM         Imaging:     Radiology report(s) reviewed. Assessment & Plan:   Mayelin Schmid is a 27 y.o. female who is pregnancy comes in for management of iron deficiency anemia. 1. Iron deficiency anemia: Anemia is mild (POC Hgb 11.4 in 2020) and iron levels are fine at this time. Current anemia may be partially due to other etiology. Pt required parenteral iron during pregnancy: Her anemia improved after Injectafer x 2 in 10/2019 and x 1 on 20. Not currently taking iron supplements. -- Labs now - call patient with the results. Reviewed results with patient that Hgb normal with will recheck labs again in 4 months. -- Return in 4 months for repeat labs, MD visit. 2. Left lateral leg numbness: Located in IT region, possibly due to bursitis or arthritis. Recommend stretching and massage. 3. Obesity: Slow recovery after recent  and not walk exercising. -- Recommend starting on gradually increasing walking program.    4. HA: Mild to moderate. Occurring daily. Taking Advil PRN. Drink plenty of fluids. Not currently seeing a PCP. Will give handouts for lost cost primary care clinics and consult JUAN Marc.    5. Hair thinning: Unclear cause without a full work-up.   Advise she establish care with a primary care provider who can check thyroid function. Given contact information for low cost clinics. I appreciate the opportunity to participate in Ms. Juice Raymond's care.     Signed By: Lou Roberts MD     June 26, 2020

## 2020-06-26 NOTE — PROGRESS NOTES
Philip Barclay is a 27 y.o. female here for follow up of anemia. Patient with complaints of frequent headaches, rates pain as a 5 out of 10 during time of visit.

## 2020-06-26 NOTE — PROGRESS NOTES
[address] [facility]  Social Work Navigator Encounter     Patient Name: Elliott MARTINEZ    Narrative: Patient here for follow-up with Dr. Rico Man. Patient is Arabic speaking.  services used. Advised patient was approved for the BS FA from 5/1/19 - 8/31/20. Copy of letter provided to patient. Referred patient to [facility] for PCP. Will assist patient reapply for FA during her October appointment. Barriers to Care: language barrier, uninsured, low income    Assessment/Action:  1. Provided FA approval letter.   2. Referred to Care JOSE ANGEL Martines    Plan/Referral:   Financial/Medication assistance referral   Primary Care referral    Thank you,  MARI LedesmaW

## 2020-06-29 NOTE — PROGRESS NOTES
Called patient using [employer]  # [id number]. Informed her, per Dr. Duane Calero, that patient's prior iron deficiency is much improved with a now normal hemoglobin. Advised that we will see patient again in October as scheduled. Patient verbalized understanding. No further questions or concerns at this time.

## 2020-10-27 NOTE — PROGRESS NOTES
[address] [facility] Oncology at [address] [place] [address]  [phone]    Hematology / Oncology Established Visit    Reason for Visit:   Daniel Pennington is a 38 y.o. female who is seen for f/u of anemia. History of Present Illness:   Daniel Pennington is a 38 y.o. female who comes in for evaluation of anemia. She is Cayman Islander speaking and was interviewed with assistance of a video . Patient is pregnant. She denies melena/hematochezia/hematuria. She denies heavy periods prior to pregnancy. She has had 3 prior pregnancies and did not have severe anemia at that time. No CP, but does endorse SOB with exertion. She endorses ice cravings, which started approx earlier this year. She states she is taking prenatal vitamins, but not taking iron supplements. She is also taking ASA and Vit C. Interval History: Today, patient is here for follow up of anemia. Patient is Azerbaijani speaking. Used video . She had injectafer x 2 early October 2019 and again Injectafer x 1 completed 12/2019. She is no longer taking iron supplements. Reports her energy level has improved. Reports she is having regular monthly periods that last 7 days. She continues to have headaches. She is taking Advil and tylenol for the headaches. She is having headaches daily. She was provided with low cost clinic options but never established care with a primary care provider to evaluate cause of headaches. Past Medical History:   Diagnosis Date    Anemia     Gestational diabetes     Herpes simplex virus (HSV) infection     Liver disease       No past surgical history on file. Social History     Tobacco Use    Smoking status: Never Smoker    Smokeless tobacco: Never Used   Substance Use Topics    Alcohol use: Not Currently      No family history on file. Current Outpatient Medications   Medication Sig    cephALEXin (KEFLEX) 500 mg capsule Take 1 Cap by mouth every six (6) hours.     ibuprofen (MOTRIN) 800 mg tablet Take 1 Tab by mouth every eight (8) hours.  prenatal vit-calcium-iron-fa (PRENATAL PLUS WITH CALCIUM) 27 mg iron- 1 mg tab Take 1 Tab by mouth daily. No current facility-administered medications for this visit. No Known Allergies     Review of Systems: A complete review of systems was obtained, negative except as described above. Physical Exam:     Visit Vitals  /74 (BP 1 Location: Left arm, BP Patient Position: Sitting)   Pulse 78   Temp 98 °F (36.7 °C) (Temporal)   Resp 18   Ht 5' 1\" (1.549 m)   Wt 222 lb (100.7 kg)   SpO2 95%   BMI 41.95 kg/m²     ECOG PS: 0  General: Well developed, no acute distress  Eyes: PERRLA, EOMI, anicteric sclerae  HENT: Atraumatic, OP clear, TMs intact without erythema  Neck: Supple  Lymphatic: No cervical, supraclavicular, axillary or inguinal adenopathy  Respiratory: CTAB, normal respiratory effort  CV: Normal rate, regular rhythm, no murmurs, no peripheral edema  GI: Soft, nontender, nondistended, no masses, no hepatomegaly, no splenomegaly  MS: Normal gait and station. Digits without clubbing or cyanosis. Skin: No rashes, ecchymoses, or petechiae. Normal temperature, turgor, and texture. Neuro/Psych: Alert, oriented. 5/5 strength in all 4 extremities. Appropriate affect, normal judgment/insight. Results:     Lab Results   Component Value Date/Time    WBC 10.9 10/29/2020 02:17 PM    HGB 13.4 10/29/2020 02:17 PM    HCT 42.0 10/29/2020 02:17 PM    PLATELET 178 10/98/7912 02:17 PM    MCV 87.0 10/29/2020 02:17 PM    ABS.  NEUTROPHILS 13.9 (H) 01/22/2020 03:08 AM    Hemoglobin (POC) 11.4 02/21/2020 05:04 PM     Lab Results   Component Value Date/Time    Sodium 138 03/05/2020 10:59 AM    Potassium 4.0 03/05/2020 10:59 AM    Chloride 103 03/05/2020 10:59 AM    CO2 28 03/05/2020 10:59 AM    Glucose 86 03/05/2020 10:59 AM    BUN 10 03/05/2020 10:59 AM    Creatinine 0.57 03/05/2020 10:59 AM    GFR est AA >60 03/05/2020 10:59 AM    GFR est non-AA >60 2020 10:59 AM    Calcium 8.9 2020 10:59 AM    Glucose (POC) 84 2020 11:59 AM     Lab Results   Component Value Date/Time    Bilirubin, total 0.4 2020 10:59 AM    ALT (SGPT) 54 2020 10:59 AM    Alk. phosphatase 154 (H) 2020 10:59 AM    Protein, total 7.8 2020 10:59 AM    Albumin 4.1 2020 10:59 AM    Globulin 3.7 2020 10:59 AM     Lab Results   Component Value Date/Time    Iron 55 2020 02:18 PM    TIBC 349 2020 02:18 PM    Iron % saturation 16 (L) 2020 02:18 PM    Ferritin 68 2020 02:18 PM       No results found for: B12LT, FOL, RBCF  No results found for: TSH, TSH2, TSH3, TSHP, TSHEXT, TSHEXT  Lab Results   Component Value Date/Time    Hep B surface Ag screen Negative 2019 11:22 AM         Imaging:     Radiology report(s) reviewed. Assessment & Plan:   Gisel Martinez is a 38 y.o. female who is pregnancy comes in for management of iron deficiency anemia. 1. H/o iron deficiency anemia: Anemia has resolved at this time. Pt required parenteral iron during pregnancy: Her anemia improved after Injectafer x 2 in 10/2019 and x 1 on 19. Not currently taking iron supplements. Labs 20 and 10/29/20 mildly low iron saturation, normal ferritin and normal Hgb. -- Advised patient start ferrous sulfated 1-2 times daily with stool softeners. -- Return in 4-5 months for repeat labs, MD visit. 2. H/o left lateral leg numbness: Located in IT region, possibly due to bursitis or arthritis. Recommend stretching and massage. Improved. 3. Obesity: Slow recovery after recent  and not walk exercising. -- Recommend starting on gradually increasing walking program.    4. HA: Mild to moderate. Occurring daily. Taking Advil PRN. Drink plenty of fluids. Not currently seeing a PCP. Will give handouts for lost cost primary care clinics and consult Danya Beckwith LCSW.       I appreciate the opportunity to participate in Ms. Priya Raymond's lay.     Signed By: Marcelino Casas MD     October 30, 2020

## 2020-10-29 DIAGNOSIS — O99.019 IRON DEFICIENCY ANEMIA DURING PREGNANCY: ICD-10-CM

## 2020-10-29 DIAGNOSIS — D50.9 IRON DEFICIENCY ANEMIA DURING PREGNANCY: ICD-10-CM

## 2020-10-29 LAB
ERYTHROCYTE [DISTWIDTH] IN BLOOD BY AUTOMATED COUNT: 13.1 % (ref 11.5–14.5)
FERRITIN SERPL-MCNC: 40 NG/ML (ref 8–252)
HCT VFR BLD AUTO: 42 % (ref 35–47)
HGB BLD-MCNC: 13.4 G/DL (ref 11.5–16)
MCH RBC QN AUTO: 27.7 PG (ref 26–34)
MCHC RBC AUTO-ENTMCNC: 31.9 G/DL (ref 30–36.5)
MCV RBC AUTO: 87 FL (ref 80–99)
NRBC # BLD: 0 K/UL (ref 0–0.01)
NRBC BLD-RTO: 0 PER 100 WBC
PLATELET # BLD AUTO: 281 K/UL (ref 150–400)
PMV BLD AUTO: 12 FL (ref 8.9–12.9)
RBC # BLD AUTO: 4.83 M/UL (ref 3.8–5.2)
WBC # BLD AUTO: 10.9 K/UL (ref 3.6–11)

## 2020-10-30 ENCOUNTER — OFFICE VISIT (OUTPATIENT)
Dept: ONCOLOGY | Age: 35
End: 2020-10-30

## 2020-10-30 ENCOUNTER — DOCUMENTATION ONLY (OUTPATIENT)
Dept: ONCOLOGY | Age: 35
End: 2020-10-30

## 2020-10-30 VITALS
RESPIRATION RATE: 18 BRPM | SYSTOLIC BLOOD PRESSURE: 115 MMHG | BODY MASS INDEX: 41.91 KG/M2 | WEIGHT: 222 LBS | OXYGEN SATURATION: 95 % | HEIGHT: 61 IN | DIASTOLIC BLOOD PRESSURE: 74 MMHG | HEART RATE: 78 BPM | TEMPERATURE: 98 F

## 2020-10-30 DIAGNOSIS — R51.9 PERSISTENT HEADACHES: ICD-10-CM

## 2020-10-30 DIAGNOSIS — Z86.39 HISTORY OF IRON DEFICIENCY: ICD-10-CM

## 2020-10-30 DIAGNOSIS — D50.9 IRON DEFICIENCY ANEMIA DURING PREGNANCY: Primary | ICD-10-CM

## 2020-10-30 DIAGNOSIS — O99.019 IRON DEFICIENCY ANEMIA DURING PREGNANCY: Primary | ICD-10-CM

## 2020-10-30 LAB
IRON SATN MFR SERPL: 18 % (ref 20–50)
IRON SERPL-MCNC: 66 UG/DL (ref 35–150)
TIBC SERPL-MCNC: 372 UG/DL (ref 250–450)

## 2020-10-30 PROCEDURE — 99214 OFFICE O/P EST MOD 30 MIN: CPT | Performed by: INTERNAL MEDICINE

## 2020-10-30 NOTE — PROGRESS NOTES
[address]  Social Work Navigator Encounter     Patient Name:  Serafin Campbell, [address] [place] [address] History: anemia    Advance Directives: none on file; conversation deferred    Narrative: Patient is uninsured. FA  20. Met with patient and provided another FA application and Med Assist number. Dr. Rena Guzmán referred patient to PCP. Provided patient with information about [facility] and [address]. Patient voiced understanding. Patient is Burundian speaking.  phones used during this visit. Barriers to Care: uninsured/low-income    Plan:  1. Referred patient to free clinics for PCP. 2. Provided FA application.      Referral:   Financial/Medication assistance referral   Primary Care referral    Thank you,  Nga Porter, JUAN

## 2020-10-30 NOTE — PROGRESS NOTES
Chief Complaint   Patient presents with   Nitin Vincent is a pleasant 38year old female who presents today as a follow up for anemia.  She denies any pain at this time

## 2020-10-30 NOTE — PROGRESS NOTES
Spoke with patient using  #[id number] and advised results per julio and told patient to start taking OTC iron supplement. Patient verbally understood per .

## 2020-10-30 NOTE — PROGRESS NOTES
Call and advised patient iron sat is still mildly low and recommend patient start on daily OTC iron supplement with stool softeners.

## 2021-01-25 DIAGNOSIS — D50.9 IRON DEFICIENCY ANEMIA DURING PREGNANCY: ICD-10-CM

## 2021-01-25 DIAGNOSIS — O99.019 IRON DEFICIENCY ANEMIA DURING PREGNANCY: ICD-10-CM

## 2022-02-01 NOTE — PROGRESS NOTES
Amparo López is a 27 y.o. female    Chief Complaint   Patient presents with    Initial Prenatal Visit     1. Have you been to the ER, urgent care clinic since your last visit? Hospitalized since your last visit? No    2. Have you seen or consulted any other health care providers outside of the [address] [place] [address] since your last visit? Include any pap smears or colon screening.  No Mom states that Tiffani was jumping and hurt her foot. She would like to discuss.

## (undated) DEVICE — TRAY,URINE METER,100% SILICONE,16FR10ML: Brand: MEDLINE

## (undated) DEVICE — SUTURE VCRL SZ 2-0 L36IN ABSRB UD L36MM CT-1 1/2 CIR J945H

## (undated) DEVICE — TIP CLEANER: Brand: VALLEYLAB

## (undated) DEVICE — STERILE POLYISOPRENE POWDER-FREE SURGICAL GLOVES WITH EMOLLIENT COATING: Brand: PROTEXIS

## (undated) DEVICE — BINDER ABD M/L H12IN FOR 46-62IN WHT 4 SLD PNL DSGN HOOP

## (undated) DEVICE — STERILE POLYISOPRENE POWDER-FREE SURGICAL GLOVES: Brand: PROTEXIS

## (undated) DEVICE — MASTISOL ADHESIVE LIQ 2/3ML

## (undated) DEVICE — C-SECTION II-LF: Brand: MEDLINE INDUSTRIES, INC.

## (undated) DEVICE — POOLE SUCTION INSTRUMENT WITH REMOVABLE SHEATH: Brand: POOLE

## (undated) DEVICE — TRAY PREP DRY W/ PREM GLV 2 APPL 6 SPNG 2 UNDPD 1 OVERWRAP

## (undated) DEVICE — SUTURE VCRL SZ 4-0 L27IN ABSRB UD L24MM FS-1 3/8 CIR REV J441H

## (undated) DEVICE — 3000CC GUARDIAN II: Brand: GUARDIAN

## (undated) DEVICE — GOWN,AURORA,FABRIC-REINFORCED,X-LARGE: Brand: MEDLINE

## (undated) DEVICE — SUTURE CHROMIC GUT SZ 2-0 L36IN ABSRB BRN L36MM CT-1 1/2 923H

## (undated) DEVICE — SUTURE VCRL SZ 0 L36IN ABSRB VLT L40MM CT 1/2 CIR J358H

## (undated) DEVICE — LARGE, DISPOSABLE ALEXIS O C-SECTION PROTECTOR - RETRACTOR: Brand: ALEXIS ® O C-SECTION PROTECTOR - RETRACTOR

## (undated) DEVICE — HANDLE LT SNAP ON ULT DURABLE LENS FOR TRUMPF ALC DISPOSABLE

## (undated) DEVICE — BLADE ASSEMB CLP HAIR FINE --

## (undated) DEVICE — PAD,ABDOMINAL,5"X9",ST,LF,25/BX: Brand: MEDLINE INDUSTRIES, INC.

## (undated) DEVICE — SOLIDIFIER FLUID 3000 CC ABSORB

## (undated) DEVICE — GARMENT,MEDLINE,DVT,INT,CALF,MED, GEN2: Brand: MEDLINE

## (undated) DEVICE — SUTURE ABSRB BRAID COAT UD CT NO 1 36IN VCRL J959H

## (undated) DEVICE — SOLUTION IRRIG 1000ML H2O STRL BLT

## (undated) DEVICE — SUTURE PLN GUT SZ 2-0 L27IN ABSRB YELLOWISH TAN L40MM CT 853H

## (undated) DEVICE — REM POLYHESIVE ADULT PATIENT RETURN ELECTRODE: Brand: VALLEYLAB

## (undated) DEVICE — SOLUTION IV 1000ML 0.9% SOD CHL

## (undated) DEVICE — ROCKER SWITCH PENCIL HOLSTER: Brand: VALLEYLAB

## (undated) DEVICE — STRIP,CLOSURE,WOUND,MEDI-STRIP,1/2X4: Brand: MEDLINE

## (undated) DEVICE — SYRINGE IRRIG 60ML SFT PLIABLE BLB EZ TO GRP 1 HND USE W/

## (undated) DEVICE — TOWEL,OR,DSP,ST,BLUE,STD,2/PK,40PK/CS: Brand: MEDLINE